# Patient Record
Sex: MALE | Race: WHITE | NOT HISPANIC OR LATINO | Employment: FULL TIME | URBAN - METROPOLITAN AREA
[De-identification: names, ages, dates, MRNs, and addresses within clinical notes are randomized per-mention and may not be internally consistent; named-entity substitution may affect disease eponyms.]

---

## 2018-12-03 ENCOUNTER — HOSPITAL ENCOUNTER (OUTPATIENT)
Dept: LAB | Facility: HOSPITAL | Age: 38
Discharge: HOME | End: 2018-12-03
Attending: INTERNAL MEDICINE | Admitting: INTERNAL MEDICINE

## 2018-12-03 LAB
ALBUMIN SERPL-MCNC: 3.6 G/DL (ref 3.5–4.8)
ALBUMIN/GLOB SERPL: 1.2 {RATIO} (ref 1–1.7)
ALP SERPL-CCNC: 75 IU/L (ref 32–91)
ALT SERPL-CCNC: 43 IU/L (ref 17–63)
ANION GAP SERPL CALC-SCNC: 11 MMOL/L (ref 10–20)
AST SERPL-CCNC: 30 IU/L (ref 15–41)
BILIRUB SERPL-MCNC: 0.5 MG/DL (ref 0.3–1.2)
BNP SERPL-MCNC: 170 PG/ML
BUN SERPL-MCNC: 11 MG/DL (ref 8–20)
BUN/CREAT SERPL: 11 (ref 6.2–20.3)
CALCIUM SERPL-MCNC: 8.8 MG/DL (ref 8.9–10.3)
CHLORIDE SERPL-SCNC: 104 MMOL/L (ref 101–111)
CHOLEST SERPL-MCNC: 190 MG/DL
CHOLEST/HDLC SERPL: 4.7 {RATIO}
CONV CO2: 28 MMOL/L (ref 22–32)
CONV LDL CHOLESTEROL DIRECT: 146 MG/DL (ref 0–100)
CONV TOTAL PROTEIN: 6.7 G/DL (ref 6.1–7.9)
CREAT UR-MCNC: 1 MG/DL (ref 0.7–1.2)
GLOBULIN UR ELPH-MCNC: 3.1 G/DL (ref 2.5–3.8)
GLUCOSE SERPL-MCNC: 90 MG/DL (ref 65–99)
HDLC SERPL-MCNC: 40 MG/DL
LDLC/HDLC SERPL: 3.6 {RATIO}
LIPID INTERPRETATION: ABNORMAL
POTASSIUM SERPL-SCNC: 4 MMOL/L (ref 3.6–5.1)
SODIUM SERPL-SCNC: 139 MMOL/L (ref 136–144)
TRIGL SERPL-MCNC: 177 MG/DL
VLDLC SERPL CALC-MCNC: 4.3 MG/DL

## 2019-07-12 RX ORDER — CARVEDILOL 12.5 MG/1
TABLET ORAL
Qty: 90 TABLET | Refills: 0 | Status: SHIPPED | OUTPATIENT
Start: 2019-07-12 | End: 2019-10-17 | Stop reason: SDUPTHER

## 2019-08-16 RX ORDER — SPIRONOLACTONE 25 MG/1
TABLET ORAL
Qty: 30 TABLET | Refills: 0 | Status: SHIPPED | OUTPATIENT
Start: 2019-08-16 | End: 2019-10-17 | Stop reason: SDUPTHER

## 2019-08-16 RX ORDER — DOFETILIDE 0.5 MG/1
CAPSULE ORAL
Qty: 60 CAPSULE | Refills: 0 | Status: SHIPPED | OUTPATIENT
Start: 2019-08-16 | End: 2019-11-27 | Stop reason: SDUPTHER

## 2019-08-19 RX ORDER — HYDROCHLOROTHIAZIDE 25 MG/1
TABLET ORAL EVERY 24 HOURS
COMMUNITY
Start: 2018-08-20 | End: 2021-05-28

## 2019-08-19 RX ORDER — HYDROCODONE BITARTRATE AND ACETAMINOPHEN 5; 325 MG/1; MG/1
1 TABLET ORAL EVERY 12 HOURS
COMMUNITY
Start: 2019-03-29 | End: 2021-05-28

## 2019-08-19 RX ORDER — LISINOPRIL 20 MG/1
TABLET ORAL EVERY 12 HOURS
COMMUNITY
Start: 2018-08-20 | End: 2020-05-18

## 2019-08-22 ENCOUNTER — LAB (OUTPATIENT)
Dept: LAB | Facility: HOSPITAL | Age: 39
End: 2019-08-22

## 2019-08-22 ENCOUNTER — OFFICE VISIT (OUTPATIENT)
Dept: CARDIOLOGY | Facility: CLINIC | Age: 39
End: 2019-08-22

## 2019-08-22 VITALS
HEIGHT: 72 IN | DIASTOLIC BLOOD PRESSURE: 127 MMHG | SYSTOLIC BLOOD PRESSURE: 157 MMHG | HEART RATE: 88 BPM | BODY MASS INDEX: 42.66 KG/M2 | WEIGHT: 315 LBS | OXYGEN SATURATION: 97 %

## 2019-08-22 DIAGNOSIS — I11.0 HYPERTENSIVE HEART DISEASE WITH CHRONIC DIASTOLIC CONGESTIVE HEART FAILURE (HCC): ICD-10-CM

## 2019-08-22 DIAGNOSIS — R06.09 DYSPNEA ON EXERTION: ICD-10-CM

## 2019-08-22 DIAGNOSIS — R00.2 PALPITATIONS: ICD-10-CM

## 2019-08-22 DIAGNOSIS — I50.32 HYPERTENSIVE HEART DISEASE WITH CHRONIC DIASTOLIC CONGESTIVE HEART FAILURE (HCC): ICD-10-CM

## 2019-08-22 DIAGNOSIS — I50.22 CHRONIC SYSTOLIC CONGESTIVE HEART FAILURE (HCC): ICD-10-CM

## 2019-08-22 DIAGNOSIS — I42.0 DILATED CARDIOMYOPATHY (HCC): ICD-10-CM

## 2019-08-22 DIAGNOSIS — I10 ESSENTIAL HYPERTENSION: Primary | ICD-10-CM

## 2019-08-22 DIAGNOSIS — I10 ESSENTIAL HYPERTENSION: ICD-10-CM

## 2019-08-22 LAB
ALBUMIN SERPL-MCNC: 3.6 G/DL (ref 3.5–4.8)
ALBUMIN/GLOB SERPL: 1.1 G/DL (ref 1–1.7)
ALP SERPL-CCNC: 64 U/L (ref 32–91)
ALT SERPL W P-5'-P-CCNC: 56 U/L (ref 17–63)
ANION GAP SERPL CALCULATED.3IONS-SCNC: 15.3 MMOL/L (ref 5–15)
AST SERPL-CCNC: 37 U/L (ref 15–41)
BILIRUB SERPL-MCNC: 0.6 MG/DL (ref 0.3–1.2)
BNP SERPL-MCNC: 119 PG/ML
BUN BLD-MCNC: 9 MG/DL (ref 8–20)
BUN/CREAT SERPL: 9 (ref 6.2–20.3)
CALCIUM SPEC-SCNC: 8.9 MG/DL (ref 8.9–10.3)
CHLORIDE SERPL-SCNC: 105 MMOL/L (ref 101–111)
CO2 SERPL-SCNC: 25 MMOL/L (ref 22–32)
CREAT BLD-MCNC: 1 MG/DL (ref 0.7–1.2)
GFR SERPL CREATININE-BSD FRML MDRD: 84 ML/MIN/1.73
GLOBULIN UR ELPH-MCNC: 3.3 GM/DL (ref 2.5–3.8)
GLUCOSE BLD-MCNC: 97 MG/DL (ref 65–99)
POTASSIUM BLD-SCNC: 4.3 MMOL/L (ref 3.6–5.1)
PROT SERPL-MCNC: 6.9 G/DL (ref 6.1–7.9)
SODIUM BLD-SCNC: 141 MMOL/L (ref 136–144)
TSH SERPL DL<=0.05 MIU/L-ACNC: 2.13 MIU/ML (ref 0.34–5.6)

## 2019-08-22 PROCEDURE — 84443 ASSAY THYROID STIM HORMONE: CPT

## 2019-08-22 PROCEDURE — 36415 COLL VENOUS BLD VENIPUNCTURE: CPT

## 2019-08-22 PROCEDURE — 99214 OFFICE O/P EST MOD 30 MIN: CPT | Performed by: INTERNAL MEDICINE

## 2019-08-22 PROCEDURE — 80053 COMPREHEN METABOLIC PANEL: CPT

## 2019-08-22 PROCEDURE — 83880 ASSAY OF NATRIURETIC PEPTIDE: CPT

## 2019-10-17 RX ORDER — SPIRONOLACTONE 25 MG/1
TABLET ORAL
Qty: 30 TABLET | Refills: 0 | Status: SHIPPED | OUTPATIENT
Start: 2019-10-17 | End: 2019-11-27 | Stop reason: SDUPTHER

## 2019-10-17 RX ORDER — CARVEDILOL 12.5 MG/1
TABLET ORAL
Qty: 90 TABLET | Refills: 0 | Status: SHIPPED | OUTPATIENT
Start: 2019-10-17 | End: 2020-01-14

## 2019-12-02 RX ORDER — DOFETILIDE 0.5 MG/1
CAPSULE ORAL
Qty: 60 CAPSULE | Refills: 0 | Status: SHIPPED | OUTPATIENT
Start: 2019-12-02 | End: 2020-01-23

## 2019-12-02 RX ORDER — SPIRONOLACTONE 25 MG/1
TABLET ORAL
Qty: 30 TABLET | Refills: 0 | Status: SHIPPED | OUTPATIENT
Start: 2019-12-02 | End: 2020-01-23

## 2020-01-14 RX ORDER — CARVEDILOL 12.5 MG/1
TABLET ORAL
Qty: 90 TABLET | Refills: 0 | Status: SHIPPED | OUTPATIENT
Start: 2020-01-14 | End: 2020-03-18

## 2020-01-23 RX ORDER — DOFETILIDE 0.5 MG/1
CAPSULE ORAL
Qty: 60 CAPSULE | Refills: 0 | Status: SHIPPED | OUTPATIENT
Start: 2020-01-23 | End: 2020-03-19

## 2020-01-23 RX ORDER — SPIRONOLACTONE 25 MG/1
TABLET ORAL
Qty: 30 TABLET | Refills: 0 | Status: SHIPPED | OUTPATIENT
Start: 2020-01-23 | End: 2020-03-19

## 2020-02-03 ENCOUNTER — OFFICE VISIT (OUTPATIENT)
Dept: CARDIOLOGY | Facility: CLINIC | Age: 40
End: 2020-02-03

## 2020-02-03 VITALS
WEIGHT: 315 LBS | OXYGEN SATURATION: 95 % | BODY MASS INDEX: 42.66 KG/M2 | DIASTOLIC BLOOD PRESSURE: 100 MMHG | HEART RATE: 81 BPM | HEIGHT: 72 IN | SYSTOLIC BLOOD PRESSURE: 143 MMHG

## 2020-02-03 DIAGNOSIS — I42.0 DILATED CARDIOMYOPATHY (HCC): ICD-10-CM

## 2020-02-03 DIAGNOSIS — R94.31 ABNORMAL EKG: ICD-10-CM

## 2020-02-03 DIAGNOSIS — I10 ESSENTIAL HYPERTENSION: ICD-10-CM

## 2020-02-03 DIAGNOSIS — I48.0 PAROXYSMAL ATRIAL FIBRILLATION (HCC): ICD-10-CM

## 2020-02-03 DIAGNOSIS — Z79.01 LONG TERM (CURRENT) USE OF ANTICOAGULANTS: ICD-10-CM

## 2020-02-03 DIAGNOSIS — R06.09 DYSPNEA ON EXERTION: Primary | ICD-10-CM

## 2020-02-03 DIAGNOSIS — I50.22 CHRONIC HFREF (HEART FAILURE WITH REDUCED EJECTION FRACTION) (HCC): ICD-10-CM

## 2020-02-03 PROCEDURE — 93000 ELECTROCARDIOGRAM COMPLETE: CPT | Performed by: INTERNAL MEDICINE

## 2020-02-03 PROCEDURE — 99214 OFFICE O/P EST MOD 30 MIN: CPT | Performed by: INTERNAL MEDICINE

## 2020-02-03 NOTE — PROGRESS NOTES
Subjective:     Encounter Date:02/03/2020      Patient ID: Marquise Short is a 39 y.o. male.    Chief Complaint : Follow-up for A. 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  Patient is complaining of dyspnea on exertion, relieved with rest.  EKG showing new abnormality compared to old EKG.  Patient is tolerating anticoagulation with Eliquis without bleeding issues.    Patient had DC cardioversion on 01/07/2019 is complaining of intermittent palpitations with no aggravating or relieving factors.  Has shortness of breath sometimes at rest sometimes with exertion relieved with rest. Denies any chest pain, patient had a nuclear which showed EF of 20% and soft tissue attenuation in the inferior defect and an echo which showed EF of 40%. patient drinks beer regularly.  Patient's arterial blood pressure is 143/100, heart rate 81, O2 sat of 95% on room air  Patient had labs from 3/21/2019 revealed normal Chem-7 magnesium of 2.2.  BNP from 12/3/2018 was elevated at 170, repeat BNP from 8/22/2019 was normal at 119,, CMP was normal 8/22/1990.     ASSESSMENT:  #Uncontrolled hypertension  #Dyspnea on exertion   # palpitations  # atrial fibrillation on long-term anticoagulation  # chronic systolic and diastolic congestive heart failure due to atrial fibrillation and tachycardia induced cardiomyopathy, HCVD  #  chest pain,  equivocal stress test with fixed inferior defect 06/29/2018  # snores, obesity  #  cardiomyopathy probably tachycardia induced  #  Hypertension/ HCVD    PLAN:  Patient's BNP level is  normal , may be the dyspnea is anginal equivalent we will schedule a stress test, patient has abnormal EKG, therefore will schedule myocardial perfusion imaging.  Risk benefits alternatives explained.  We will check lipid profile and CMP before next visit  Will continue Eliquis, carvedilol, lisinopril hydrochlorothiazide.  Patient would benefit from sleep apnea evaluation, will send to Pulmonary for sleep evaluation   continue anticoagulation with Eliquis, risk benefits alternatives explained   counseled on smoking  cessation and ETOH cessation absolutely & completely   counseled on weight loss diet and exercise and compliance  Reviewed EKG results with patient  This patient to check blood pressure at home         Assessment:          Diagnosis Plan   1. Dyspnea on exertion  Stress Test With Myocardial Perfusion One Day    Comprehensive Metabolic Panel    Lipid Panel   2. Paroxysmal atrial fibrillation (CMS/HCC)  Stress Test With Myocardial Perfusion One Day    Comprehensive Metabolic Panel    Lipid Panel   3. Chronic HFrEF (heart failure with reduced ejection fraction) (CMS/HCC)  Stress Test With Myocardial Perfusion One Day    Comprehensive Metabolic Panel    Lipid Panel   4. Dilated cardiomyopathy (CMS/HCC)  Stress Test With Myocardial Perfusion One Day    Comprehensive Metabolic Panel    Lipid Panel   5. Essential hypertension  Stress Test With Myocardial Perfusion One Day    Comprehensive Metabolic Panel    Lipid Panel   6. Long term (current) use of anticoagulants  Stress Test With Myocardial Perfusion One Day    Comprehensive Metabolic Panel    Lipid Panel   7. Abnormal EKG            Plan:         Past Medical History:  Past Medical History:   Diagnosis Date   • A-fib (CMS/HCC)    • Cardiomyopathy (CMS/HCC)    • Hypertension      Past Surgical History:  Past Surgical History:   Procedure Laterality Date   • CARDIOVERSION        Allergies:  No Known Allergies  Home Meds:  Current Meds:     Current Outpatient  Medications:   •  apixaban (ELIQUIS) 5 MG tablet tablet, ELIQUIS 5 MG TABS, Disp: , Rfl:   •  carvedilol (COREG) 12.5 MG tablet, TAKE 1 TABLET BY MOUTH EVERY DAY, Disp: 90 tablet, Rfl: 0  •  dofetilide (TIKOSYN) 500 MCG capsule, TAKE 1 CAPSULE(500 MCG) BY MOUTH TWICE DAILY, Disp: 60 capsule, Rfl: 0  •  lisinopril (PRINIVIL,ZESTRIL) 20 MG tablet, Every 12 (Twelve) Hours., Disp: , Rfl:   •  spironolactone (ALDACTONE) 25 MG tablet, TAKE 2 TABLETS BY MOUTH ONCE DAILY, Disp: 30 tablet, Rfl: 0  •  hydrochlorothiazide (HYDRODIURIL) 25 MG tablet, Daily., Disp: , Rfl:   •  HYDROcodone-acetaminophen (NORCO) 5-325 MG per tablet, 1 capsule Every 12 (Twelve) Hours., Disp: , Rfl:   Social History:   Social History     Tobacco Use   • Smoking status: Current Every Day Smoker     Years: 20.00   • Smokeless tobacco: Current User     Types: Chew   Substance Use Topics   • Alcohol use: Not on file      Family History:  Family History   Problem Relation Age of Onset   • Heart attack Paternal Grandfather         The following portions of the patient's history were reviewed and updated as appropriate: allergies, current medications, past family history, past medical history, past social history, past surgical history and problem list.    Review of Systems   Cardiovascular: Positive for dyspnea on exertion. Negative for chest pain, leg swelling and palpitations.   Respiratory: Positive for shortness of breath.    Neurological: Positive for numbness. Negative for dizziness.     Comprehensive review of systems were reviewed and all others review of systems were found to be negative other than HPI      ECG 12 Lead  Date/Time: 2/3/2020 6:20 PM  Performed by: Davon Kohler MD  Authorized by: Davon Kohler MD   Comparison: compared with previous ECG from 3/19/2019  Comparison to previous ECG: EKG done today reviewed by me shows sinus rhythm with inferior Q waves and T wave inversion and ST segment depression in  "inferolateral leads which is new compared to EKG from 3/19/2019                 Objective:     Physical Exam  /100 (BP Location: Left arm, Patient Position: Sitting, Cuff Size: Large Adult)   Pulse 81   Ht 182.9 cm (72\")   Wt (!) 158 kg (349 lb)   SpO2 95%   BMI 47.33 kg/m²   General:  Appears in no acute distress  Eyes: Sclera is anicteric,  conjunctiva is clear   HEENT:  No JVD. Thyroid not visibly enlarged. No mucosal pallor or cyanosis  Respiratory: Respirations regular and unlabored at rest.  Bilaterally good breath sounds, with good air entry in all fields. No crackles, rubs or wheezes auscultated  Cardiovascular: S1,S2 Regular rate and rhythm. No murmur, rub or gallop auscultated. No pretibial pitting edema  Gastrointestinal: Abdomen soft, flat, non tender. Bowel sounds present.   Musculoskeletal:  No abnormal movements  Extremities: No digital clubbing or cyanosis  Skin: Color pink. Skin warm and dry to touch. No rashes  No xanthoma  Neuro: Alert and awake, no lateralizing deficits appreciated    Lab Reviewed:                  "

## 2020-03-18 RX ORDER — CARVEDILOL 12.5 MG/1
TABLET ORAL
Qty: 90 TABLET | Refills: 0 | Status: SHIPPED | OUTPATIENT
Start: 2020-03-18 | End: 2020-04-14

## 2020-03-19 RX ORDER — SPIRONOLACTONE 25 MG/1
TABLET ORAL
Qty: 30 TABLET | Refills: 0 | Status: SHIPPED | OUTPATIENT
Start: 2020-03-19 | End: 2020-05-18

## 2020-03-19 RX ORDER — DOFETILIDE 0.5 MG/1
CAPSULE ORAL
Qty: 60 CAPSULE | Refills: 0 | Status: SHIPPED | OUTPATIENT
Start: 2020-03-19 | End: 2020-05-18

## 2020-04-14 RX ORDER — CARVEDILOL 12.5 MG/1
TABLET ORAL
Qty: 90 TABLET | Refills: 0 | Status: SHIPPED | OUTPATIENT
Start: 2020-04-14 | End: 2020-09-17 | Stop reason: SDUPTHER

## 2020-04-15 RX ORDER — APIXABAN 5 MG/1
TABLET, FILM COATED ORAL
Qty: 90 TABLET | OUTPATIENT
Start: 2020-04-15

## 2020-04-15 NOTE — TELEPHONE ENCOUNTER
Spoke with pt, he was advised he needs to be seen in office. Pt  does not want an appt, Pt stated he will just ask Dr Kohler for the refills.

## 2020-05-15 RX ORDER — SPIRONOLACTONE 25 MG/1
TABLET ORAL
Qty: 30 TABLET | Refills: 0 | OUTPATIENT
Start: 2020-05-15

## 2020-05-15 RX ORDER — LISINOPRIL 20 MG/1
TABLET ORAL
Qty: 180 TABLET | OUTPATIENT
Start: 2020-05-15

## 2020-05-15 RX ORDER — DOFETILIDE 0.5 MG/1
CAPSULE ORAL
Qty: 60 CAPSULE | Refills: 0 | OUTPATIENT
Start: 2020-05-15

## 2020-05-18 RX ORDER — SPIRONOLACTONE 25 MG/1
TABLET ORAL
Qty: 180 TABLET | Refills: 3 | Status: SHIPPED | OUTPATIENT
Start: 2020-05-18 | End: 2021-05-28 | Stop reason: SDUPTHER

## 2020-05-18 RX ORDER — DOFETILIDE 0.5 MG/1
CAPSULE ORAL
Qty: 180 CAPSULE | Refills: 3 | Status: SHIPPED | OUTPATIENT
Start: 2020-05-18 | End: 2023-01-30 | Stop reason: SDUPTHER

## 2020-05-18 RX ORDER — LISINOPRIL 20 MG/1
TABLET ORAL
Qty: 180 TABLET | Refills: 3 | Status: SHIPPED | OUTPATIENT
Start: 2020-05-18 | End: 2021-05-28 | Stop reason: SDUPTHER

## 2020-07-21 ENCOUNTER — TELEPHONE (OUTPATIENT)
Dept: CARDIOLOGY | Facility: CLINIC | Age: 40
End: 2020-07-21

## 2020-09-17 RX ORDER — CARVEDILOL 12.5 MG/1
TABLET ORAL
Qty: 90 TABLET | Refills: 0 | OUTPATIENT
Start: 2020-09-17

## 2020-09-17 RX ORDER — CARVEDILOL 12.5 MG/1
12.5 TABLET ORAL DAILY
Qty: 90 TABLET | Refills: 1 | Status: SHIPPED | OUTPATIENT
Start: 2020-09-17 | End: 2020-10-20 | Stop reason: SDUPTHER

## 2020-10-20 RX ORDER — CARVEDILOL 12.5 MG/1
12.5 TABLET ORAL DAILY
Qty: 90 TABLET | Refills: 1 | Status: SHIPPED | OUTPATIENT
Start: 2020-10-20 | End: 2021-03-25

## 2021-03-25 RX ORDER — CARVEDILOL 12.5 MG/1
12.5 TABLET ORAL DAILY
Qty: 90 TABLET | Refills: 0 | Status: SHIPPED | OUTPATIENT
Start: 2021-03-25 | End: 2021-05-28 | Stop reason: SDUPTHER

## 2021-05-03 RX ORDER — APIXABAN 5 MG/1
TABLET, FILM COATED ORAL
Qty: 30 TABLET | Refills: 0 | Status: SHIPPED | OUTPATIENT
Start: 2021-05-03 | End: 2021-05-28 | Stop reason: SDUPTHER

## 2021-05-28 ENCOUNTER — OFFICE VISIT (OUTPATIENT)
Dept: CARDIOLOGY | Facility: CLINIC | Age: 41
End: 2021-05-28

## 2021-05-28 VITALS — WEIGHT: 315 LBS | HEIGHT: 72 IN | BODY MASS INDEX: 42.66 KG/M2

## 2021-05-28 DIAGNOSIS — I10 ESSENTIAL HYPERTENSION: ICD-10-CM

## 2021-05-28 DIAGNOSIS — R06.09 DYSPNEA ON EXERTION: Primary | ICD-10-CM

## 2021-05-28 DIAGNOSIS — Z79.01 LONG TERM (CURRENT) USE OF ANTICOAGULANTS: ICD-10-CM

## 2021-05-28 DIAGNOSIS — I48.0 PAROXYSMAL ATRIAL FIBRILLATION (HCC): ICD-10-CM

## 2021-05-28 DIAGNOSIS — E66.01 MORBID OBESITY WITH BMI OF 45.0-49.9, ADULT (HCC): ICD-10-CM

## 2021-05-28 DIAGNOSIS — I50.22 CHRONIC HFREF (HEART FAILURE WITH REDUCED EJECTION FRACTION) (HCC): ICD-10-CM

## 2021-05-28 DIAGNOSIS — I42.0 DILATED CARDIOMYOPATHY (HCC): ICD-10-CM

## 2021-05-28 DIAGNOSIS — I50.32 HYPERTENSIVE HEART DISEASE WITH CHRONIC DIASTOLIC CONGESTIVE HEART FAILURE (HCC): ICD-10-CM

## 2021-05-28 DIAGNOSIS — I11.0 HYPERTENSIVE HEART DISEASE WITH CHRONIC DIASTOLIC CONGESTIVE HEART FAILURE (HCC): ICD-10-CM

## 2021-05-28 PROCEDURE — 99213 OFFICE O/P EST LOW 20 MIN: CPT | Performed by: INTERNAL MEDICINE

## 2021-05-28 RX ORDER — CARVEDILOL 12.5 MG/1
12.5 TABLET ORAL DAILY
Qty: 90 TABLET | Refills: 0 | Status: SHIPPED | OUTPATIENT
Start: 2021-05-28 | End: 2021-10-06

## 2021-05-28 RX ORDER — LISINOPRIL 20 MG/1
20 TABLET ORAL 2 TIMES DAILY
Qty: 180 TABLET | Refills: 0 | Status: SHIPPED | OUTPATIENT
Start: 2021-05-28 | End: 2021-08-31

## 2021-05-28 RX ORDER — SPIRONOLACTONE 25 MG/1
50 TABLET ORAL DAILY
Qty: 180 TABLET | Refills: 0 | Status: SHIPPED | OUTPATIENT
Start: 2021-05-28 | End: 2023-01-30 | Stop reason: SDUPTHER

## 2021-05-28 NOTE — PROGRESS NOTES
Subjective:     Encounter Date:05/28/2021      Patient ID: Marquise Short is a 40 y.o. male.    Chief Complaint :Follow-up for A. fib, cardiomyopathy, hypertensive cardiovascular disease  History of Present Illness    You have chosen to receive care through a telephone visit. Do you consent to use a telephone visit for your medical care today? 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  Patient is complaining of dyspnea on exertion which is unchanged.  Denies any chest pain.  Patient has not been compliant with follow-up once his medications refilled with telephone visit.  Patient is on high risk medications like Tikosyn which need EKG and with his comorbid problems needs regular labs.  Patient is tolerating anticoagulation with Eliquis without bleeding issues.  Patient had DC cardioversion on 01/07/2019.  Has shortness of breath sometimes at rest sometimes with exertion relieved with rest. Denies any chest pain, patient had a nuclear which showed EF of 20% and soft tissue attenuation in the inferior defect and an echo which showed EF of 40%. patient drinks beer regularly.  Patient had labs from 3/21/2019 revealed normal Chem-7 magnesium of 2.2.  BNP from 12/3/2018 was elevated at 170, repeat BNP from 8/22/2019 was normal at 119,, CMP was normal 8/22/1990.     ASSESSMENT:  #Paroxysmal A. fib on long-term anticoagulation and Tikosyn  #Dyspnea on exertion   #Hypertension  # chronic systolic and diastolic congestive heart failure due to atrial fibrillation and tachycardia induced cardiomyopathy, HCVD  #  chest pain,  equivocal stress test with fixed inferior defect  06/29/2018  # snores, obesity  #  cardiomyopathy probably tachycardia induced  #  Hypertension/ HCVD    PLAN:  Had a lengthy discussion with patient patient cannot do televisits he needs in person visit with EKG and lab since he is on high risk medication.  If he cannot make the appointment so will not refill his medications.  Will continue Eliquis, carvedilol, lisinopril hydrochlorothiazide for a month and check lipid profile CMP BNP level and EKG and follow-up..  Patient would benefit from sleep apnea evaluation, will send to Pulmonary for sleep evaluation  Patient has [id number] or of 2 due to hypertension and CHF, would benefit from long-term anticoagulation continue anticoagulation with Eliquis, risk benefits alternatives explained   counseled on smoking  cessation and ETOH cessation absolutely & completely   counseled on weight loss diet and exercise and compliance  Advised to take an appointment within a month with labs.  Today's visit was done with telephone visit since patient cannot come to office due to work issues.  Spent 12 minutes with patient on video.          Assessment:         MDM     Diagnosis Plan   1. Dyspnea on exertion  Comprehensive Metabolic Panel    Lipid Panel    TSH    BNP   2. Paroxysmal atrial fibrillation (CMS/HCC)  Comprehensive Metabolic Panel    Lipid Panel    TSH    BNP   3. Chronic HFrEF (heart failure with reduced ejection fraction) (CMS/HCC)  Comprehensive Metabolic Panel    Lipid Panel    TSH    BNP   4. Dilated cardiomyopathy (CMS/HCC)  Comprehensive Metabolic Panel    Lipid Panel    TSH    BNP   5. Essential hypertension  Comprehensive Metabolic Panel    Lipid Panel    TSH    BNP   6. Hypertensive heart disease with chronic diastolic congestive heart failure (CMS/HCC)  Comprehensive Metabolic Panel    Lipid Panel    TSH    BNP   7. Long term (current) use of anticoagulants  Comprehensive Metabolic Panel    Lipid Panel    TSH    BNP   8. Morbid obesity with BMI of  "45.0-49.9, adult (CMS/[facility])  Comprehensive Metabolic Panel    Lipid Panel    TSH    BNP          Plan:         Past Medical History:  Past Medical History:   Diagnosis Date   • A-fib (CMS/[facility])    • Cardiomyopathy (CMS/[facility])    • Hypertension      Past Surgical History:  Past Surgical History:   Procedure Laterality Date   • CARDIOVERSION        Allergies:  No Known Allergies  Home Meds:  Current Meds:     Current Outpatient Medications:   •  apixaban (Eliquis) 5 MG tablet tablet, Take 1 tablet by mouth Daily., Disp: 180 tablet, Rfl: 0  •  carvedilol (COREG) 12.5 MG tablet, Take 1 tablet by mouth Daily., Disp: 90 tablet, Rfl: 0  •  dofetilide (TIKOSYN) 500 MCG capsule, TAKE 1 CAPSULE(500 MCG) BY MOUTH TWICE DAILY, Disp: 180 capsule, Rfl: 3  •  lisinopril (PRINIVIL,ZESTRIL) 20 MG tablet, Take 1 tablet by mouth 2 (Two) Times a Day., Disp: 180 tablet, Rfl: 0  •  spironolactone (ALDACTONE) 25 MG tablet, Take 2 tablets by mouth Daily., Disp: 180 tablet, Rfl: 0  Social History:   Social History     Tobacco Use   • Smoking status: Current Every Day Smoker     Years: 20.00   • Smokeless tobacco: Current User     Types: Chew   Substance Use Topics   • Alcohol use: Not on file      Family History:  Family History   Problem Relation Age of Onset   • Heart attack Paternal Grandfather         The following portions of the patient's history were reviewed and updated as appropriate: allergies, current medications, past family history, past medical history, past social history, past surgical history and problem list.      Review of Systems   Constitutional: Positive for malaise/fatigue.   Cardiovascular: Negative for chest pain, leg swelling and palpitations.   Respiratory: Positive for shortness of breath.    Skin: Negative for rash.   Neurological: Negative for dizziness, light-headedness and numbness.     All other systems are negative    Procedures       Objective:     Physical Exam  Ht 182.9 cm (72\")   Wt (!) 154 kg (340 lb)   " BMI 46.11 kg/m²   General:  Appears in no acute distress, obese  Eyes: Sclera is anicteric,  conjunctiva is clear   HEENT:  Thyroid not visibly enlarged. No mucosal pallor or cyanosis  Respiratory: Respirations regular and unlabored at rest.  Skin: Color pink.   Neuro: Alert and awake.    Lab Reviewed:

## 2021-06-24 ENCOUNTER — TELEPHONE (OUTPATIENT)
Dept: CARDIOLOGY | Facility: CLINIC | Age: 41
End: 2021-06-24

## 2021-06-24 DIAGNOSIS — I50.22 CHRONIC HFREF (HEART FAILURE WITH REDUCED EJECTION FRACTION) (HCC): ICD-10-CM

## 2021-06-24 DIAGNOSIS — I50.32 HYPERTENSIVE HEART DISEASE WITH CHRONIC DIASTOLIC CONGESTIVE HEART FAILURE (HCC): ICD-10-CM

## 2021-06-24 DIAGNOSIS — Z79.01 LONG TERM (CURRENT) USE OF ANTICOAGULANTS: ICD-10-CM

## 2021-06-24 DIAGNOSIS — I48.0 PAROXYSMAL ATRIAL FIBRILLATION (HCC): ICD-10-CM

## 2021-06-24 DIAGNOSIS — I42.0 DILATED CARDIOMYOPATHY (HCC): ICD-10-CM

## 2021-06-24 DIAGNOSIS — E66.01 MORBID OBESITY WITH BMI OF 45.0-49.9, ADULT (HCC): ICD-10-CM

## 2021-06-24 DIAGNOSIS — I10 ESSENTIAL HYPERTENSION: ICD-10-CM

## 2021-06-24 DIAGNOSIS — R06.09 DYSPNEA ON EXERTION: ICD-10-CM

## 2021-06-24 DIAGNOSIS — I11.0 HYPERTENSIVE HEART DISEASE WITH CHRONIC DIASTOLIC CONGESTIVE HEART FAILURE (HCC): ICD-10-CM

## 2021-06-24 NOTE — TELEPHONE ENCOUNTER
He could not understand dr foley on 5/28 video call, please advise him if he needs labs and talk about prescriptions

## 2021-07-31 ENCOUNTER — APPOINTMENT (OUTPATIENT)
Dept: CT IMAGING | Facility: HOSPITAL | Age: 41
End: 2021-07-31

## 2021-07-31 ENCOUNTER — HOSPITAL ENCOUNTER (EMERGENCY)
Facility: HOSPITAL | Age: 41
Discharge: HOME | End: 2021-07-31
Attending: EMERGENCY MEDICINE | Admitting: EMERGENCY MEDICINE

## 2021-07-31 VITALS
DIASTOLIC BLOOD PRESSURE: 93 MMHG | SYSTOLIC BLOOD PRESSURE: 147 MMHG | TEMPERATURE: 98.1 F | HEART RATE: 86 BPM | RESPIRATION RATE: 18 BRPM | WEIGHT: 315 LBS | BODY MASS INDEX: 42.66 KG/M2 | OXYGEN SATURATION: 98 % | HEIGHT: 72 IN

## 2021-07-31 DIAGNOSIS — M54.50 ACUTE BILATERAL LOW BACK PAIN WITHOUT SCIATICA: Primary | ICD-10-CM

## 2021-07-31 DIAGNOSIS — H10.31 ACUTE CONJUNCTIVITIS OF RIGHT EYE, UNSPECIFIED ACUTE CONJUNCTIVITIS TYPE: ICD-10-CM

## 2021-07-31 LAB
BILIRUB UR QL STRIP: NEGATIVE
CLARITY UR: CLEAR
COLOR UR: YELLOW
GLUCOSE UR STRIP-MCNC: NEGATIVE MG/DL
HGB UR QL STRIP.AUTO: NEGATIVE
KETONES UR QL STRIP: NEGATIVE
LEUKOCYTE ESTERASE UR QL STRIP.AUTO: NEGATIVE
NITRITE UR QL STRIP: NEGATIVE
PH UR STRIP.AUTO: 6 [PH] (ref 5–8)
PROT UR QL STRIP: NEGATIVE
SP GR UR STRIP: 1.02 (ref 1–1.03)
UROBILINOGEN UR QL STRIP: NORMAL

## 2021-07-31 PROCEDURE — 74176 CT ABD & PELVIS W/O CONTRAST: CPT

## 2021-07-31 PROCEDURE — 99283 EMERGENCY DEPT VISIT LOW MDM: CPT

## 2021-07-31 PROCEDURE — 81003 URINALYSIS AUTO W/O SCOPE: CPT | Performed by: EMERGENCY MEDICINE

## 2021-07-31 RX ORDER — POLYMYXIN B SULFATE AND TRIMETHOPRIM 1; 10000 MG/ML; [USP'U]/ML
1 SOLUTION OPHTHALMIC EVERY 4 HOURS
Qty: 10 ML | Refills: 0 | Status: SHIPPED | OUTPATIENT
Start: 2021-07-31 | End: 2021-08-07

## 2021-07-31 RX ORDER — CYCLOBENZAPRINE HCL 10 MG
10 TABLET ORAL 3 TIMES DAILY PRN
Qty: 20 TABLET | Refills: 0 | Status: SHIPPED | OUTPATIENT
Start: 2021-07-31 | End: 2023-02-18

## 2021-07-31 RX ORDER — HYDROCODONE BITARTRATE AND ACETAMINOPHEN 7.5; 325 MG/1; MG/1
1 TABLET ORAL EVERY 6 HOURS PRN
Qty: 12 TABLET | Refills: 0 | Status: SHIPPED | OUTPATIENT
Start: 2021-07-31 | End: 2023-02-18

## 2021-07-31 RX ORDER — METHYLPREDNISOLONE 4 MG/1
TABLET ORAL
Qty: 21 TABLET | Refills: 0 | Status: SHIPPED | OUTPATIENT
Start: 2021-07-31 | End: 2023-02-18

## 2021-08-31 RX ORDER — LISINOPRIL 20 MG/1
TABLET ORAL
Qty: 180 TABLET | Refills: 2 | Status: SHIPPED | OUTPATIENT
Start: 2021-08-31 | End: 2022-10-24

## 2021-08-31 NOTE — TELEPHONE ENCOUNTER
Rx Refill Note  Requested Prescriptions     Pending Prescriptions Disp Refills   • lisinopril (PRINIVIL,ZESTRIL) 20 MG tablet [Pharmacy Med Name: LISINOPRIL 20MG TABLETS] 180 tablet 0     Sig: TAKE 1 TABLET BY MOUTH TWICE DAILY      Last office visit with prescribing clinician: 5/28/2021      Next office visit with prescribing clinician: Visit date not found            Jaleesa Iqbal MA  08/31/21, 11:20 EDT

## 2021-10-06 RX ORDER — CARVEDILOL 12.5 MG/1
12.5 TABLET ORAL DAILY
Qty: 90 TABLET | Refills: 2 | Status: SHIPPED | OUTPATIENT
Start: 2021-10-06 | End: 2022-09-12

## 2021-10-06 NOTE — TELEPHONE ENCOUNTER
Rx Refill Note  Requested Prescriptions     Pending Prescriptions Disp Refills   • carvedilol (COREG) 12.5 MG tablet [Pharmacy Med Name: CARVEDILOL 12.5MG TABLETS] 90 tablet 0     Sig: TAKE 1 TABLET BY MOUTH DAILY      Last office visit with prescribing clinician: 5/28/2021      Next office visit with prescribing clinician: Visit date not found            Jaleesa Iqbal MA  10/06/21, 10:59 EDT

## 2021-11-01 RX ORDER — APIXABAN 5 MG/1
TABLET, FILM COATED ORAL
Qty: 90 TABLET | Refills: 1 | Status: SHIPPED | OUTPATIENT
Start: 2021-11-01 | End: 2022-02-04

## 2021-11-01 NOTE — TELEPHONE ENCOUNTER
Rx Refill Note  Requested Prescriptions     Pending Prescriptions Disp Refills   • Eliquis 5 MG tablet tablet [Pharmacy Med Name: ELIQUIS 5MG TABLETS] 90 tablet      Sig: TAKE 1 TABLET BY MOUTH DAILY      Last office visit with prescribing clinician: 5/28/2021      Next office visit with prescribing clinician: Visit date not found            Aria Rincon MA  11/01/21, 08:44 EDT

## 2022-01-31 ENCOUNTER — TELEPHONE (OUTPATIENT)
Dept: CARDIOLOGY | Facility: CLINIC | Age: 42
End: 2022-01-31

## 2022-02-01 NOTE — TELEPHONE ENCOUNTER
Xarelto is the preferred drug on patients plan. Insurance has denied coverage for Eliqius, stating they would need documentation stating why the patient is unable to take Xarelto.      Please advise.

## 2022-02-02 NOTE — TELEPHONE ENCOUNTER
Per Dr. Kohler, switch to Xarelto 20 mg. Pt made aware and verbalized understanding.       Please d/c Eliquis for the medication list.

## 2022-09-12 ENCOUNTER — TELEPHONE (OUTPATIENT)
Dept: CARDIOLOGY | Facility: CLINIC | Age: 42
End: 2022-09-12

## 2022-09-12 RX ORDER — CARVEDILOL 12.5 MG/1
12.5 TABLET ORAL DAILY
Qty: 30 TABLET | Refills: 0 | Status: SHIPPED | OUTPATIENT
Start: 2022-09-12 | End: 2022-10-24

## 2022-09-12 RX ORDER — RIVAROXABAN 20 MG/1
TABLET, FILM COATED ORAL
Qty: 30 TABLET | Refills: 0 | Status: SHIPPED | OUTPATIENT
Start: 2022-09-12 | End: 2022-10-24

## 2022-09-12 NOTE — TELEPHONE ENCOUNTER
Rx Refill Note  Requested Prescriptions     Pending Prescriptions Disp Refills   • Xarelto 20 MG tablet [Pharmacy Med Name: XARELTO 20MG TABLETS] 30 tablet 0     Sig: TAKE 1 TABLET BY MOUTH DAILY   • carvedilol (COREG) 12.5 MG tablet [Pharmacy Med Name: CARVEDILOL 12.5MG TABLETS] 30 tablet 0     Sig: TAKE 1 TABLET BY MOUTH DAILY      Last office visit with prescribing clinician: 5/28/2021      Next office visit with prescribing clinician: Visit date not found            Page ASIYA Mead  09/12/22, 10:15 EDT

## 2022-09-14 NOTE — TELEPHONE ENCOUNTER
TRIED TO CALL PATIENT AGAIN. PHONE GOES STRAIGHT TO VOICEMAIL AND VOICEMAIL IS FULL. WILL MAIL LETTER TO PATIENT.

## 2022-10-24 RX ORDER — LISINOPRIL 20 MG/1
20 TABLET ORAL 2 TIMES DAILY
Qty: 30 TABLET | Refills: 0 | OUTPATIENT
Start: 2022-10-24 | End: 2023-01-30 | Stop reason: SDUPTHER

## 2022-10-24 RX ORDER — RIVAROXABAN 20 MG/1
TABLET, FILM COATED ORAL
Qty: 15 TABLET | Refills: 0 | Status: SHIPPED | OUTPATIENT
Start: 2022-10-24 | End: 2023-01-30 | Stop reason: SDUPTHER

## 2022-10-24 RX ORDER — CARVEDILOL 12.5 MG/1
12.5 TABLET ORAL DAILY
Qty: 15 TABLET | Refills: 0 | Status: SHIPPED | OUTPATIENT
Start: 2022-10-24 | End: 2022-12-01

## 2022-10-24 RX ORDER — LISINOPRIL 20 MG/1
TABLET ORAL
Qty: 180 TABLET | Refills: 0 | Status: SHIPPED | OUTPATIENT
Start: 2022-10-24 | End: 2022-10-24 | Stop reason: SDUPTHER

## 2022-10-24 NOTE — TELEPHONE ENCOUNTER
Rx Refill Note  Requested Prescriptions     Pending Prescriptions Disp Refills   • lisinopril (PRINIVIL,ZESTRIL) 20 MG tablet [Pharmacy Med Name: LISINOPRIL 20MG TABLETS] 180 tablet 2     Sig: TAKE 1 TABLET BY MOUTH TWICE DAILY      Last office visit with prescribing clinician: 5/28/2021      Next office visit with prescribing clinician: 10/23/2022            Jaleesa Iqbal MA  10/24/22, 10:17 EDT

## 2022-10-24 NOTE — TELEPHONE ENCOUNTER
Rx Refill Note  Requested Prescriptions     Pending Prescriptions Disp Refills   • Xarelto 20 MG tablet [Pharmacy Med Name: XARELTO 20MG TABLETS] 30 tablet 0     Sig: TAKE 1 TABLET BY MOUTH DAILY   • carvedilol (COREG) 12.5 MG tablet [Pharmacy Med Name: CARVEDILOL 12.5MG TABLETS] 30 tablet 0     Sig: TAKE 1 TABLET BY MOUTH DAILY      Last office visit with prescribing clinician: 5/28/2021      Next office visit with prescribing clinician: Visit date not found            Jaleesa Iqbal MA  10/24/22, 10:46 EDT

## 2022-10-24 NOTE — TELEPHONE ENCOUNTER
Pharmacy contacted regarding Lisinopril refill. Quantity changed to 30. Attempted to contact the patient, [facility] full. Letter sent requesting patient make an appointment.

## 2022-11-02 RX ORDER — LISINOPRIL 20 MG/1
TABLET ORAL
Qty: 30 TABLET | Refills: 0 | OUTPATIENT
Start: 2022-11-02

## 2022-11-02 NOTE — TELEPHONE ENCOUNTER
Rx Refill Note  Requested Prescriptions     Pending Prescriptions Disp Refills   • lisinopril (PRINIVIL,ZESTRIL) 20 MG tablet [Pharmacy Med Name: LISINOPRIL 20MG TABLETS] 30 tablet 0     Sig: TAKE 1 TABLET BY MOUTH TWICE DAILY      Last office visit with prescribing clinician: 5/28/2021      Next office visit with prescribing clinician: Visit date not found            Kelley Dockery MA  11/02/22, 08:36 EDT

## 2022-12-01 RX ORDER — CARVEDILOL 12.5 MG/1
12.5 TABLET ORAL DAILY
Qty: 15 TABLET | Refills: 0 | Status: SHIPPED | OUTPATIENT
Start: 2022-12-01 | End: 2023-01-30 | Stop reason: SDUPTHER

## 2022-12-01 NOTE — TELEPHONE ENCOUNTER
Rx Refill Note  Requested Prescriptions     Pending Prescriptions Disp Refills   • carvedilol (COREG) 12.5 MG tablet [Pharmacy Med Name: CARVEDILOL 12.5MG TABLETS] 15 tablet 0     Sig: TAKE 1 TABLET BY MOUTH DAILY      Last office visit with prescribing clinician: 5/28/2021   Last telemedicine visit with prescribing clinician: Visit date not found   Next office visit with prescribing clinician: Visit date not found                         Would you like a call back once the refill request has been completed: [] Yes [] No    If the office needs to give you a call back, can they leave a voicemail: [] Yes [] No    Jaleesa Iqbal MA  12/01/22, 09:19 EST

## 2022-12-20 ENCOUNTER — TELEPHONE (OUTPATIENT)
Dept: CARDIOLOGY | Facility: CLINIC | Age: 42
End: 2022-12-20

## 2022-12-20 RX ORDER — RIVAROXABAN 20 MG/1
TABLET, FILM COATED ORAL
Qty: 15 TABLET | Refills: 0 | OUTPATIENT
Start: 2022-12-20

## 2022-12-20 NOTE — TELEPHONE ENCOUNTER
Letter sent to patient in September, several calls have been made, notes to pharmacy were sent requesting that patient make a follow up appointment. LOV: 5/28/2021    Would you like to continue to fill medications?

## 2022-12-20 NOTE — TELEPHONE ENCOUNTER
Rx Refill Note  Requested Prescriptions     Pending Prescriptions Disp Refills   • Xarelto 20 MG tablet [Pharmacy Med Name: XARELTO 20MG TABLETS] 15 tablet 0     Sig: TAKE 1 TABLET BY MOUTH DAILY      Last office visit with prescribing clinician: 5/28/2021   Last telemedicine visit with prescribing clinician: Visit date not found   Next office visit with prescribing clinician: Visit date not found                         Would you like a call back once the refill request has been completed: [] Yes [] No    If the office needs to give you a call back, can they leave a voicemail: [] Yes [] No    Jaleesa Iqbal MA  12/20/22, 09:56 EST

## 2023-01-23 RX ORDER — RIVAROXABAN 20 MG/1
TABLET, FILM COATED ORAL
Qty: 15 TABLET | Refills: 0 | OUTPATIENT
Start: 2023-01-23

## 2023-01-23 RX ORDER — LISINOPRIL 20 MG/1
TABLET ORAL
Qty: 30 TABLET | Refills: 0 | OUTPATIENT
Start: 2023-01-23

## 2023-01-23 RX ORDER — CARVEDILOL 12.5 MG/1
12.5 TABLET ORAL DAILY
Qty: 15 TABLET | Refills: 0 | OUTPATIENT
Start: 2023-01-23

## 2023-01-23 NOTE — TELEPHONE ENCOUNTER
LOV MAY 2021      PHONE CALLS AND LETTERS SENT TO PT REGARDING MAKING A FOLLOW UP APPT     PER DR MO  NO MORE REFILLS TILL PT IS SEEN

## 2023-01-27 RX ORDER — LISINOPRIL 20 MG/1
20 TABLET ORAL 2 TIMES DAILY
Qty: 30 TABLET | Refills: 0 | OUTPATIENT
Start: 2023-01-27

## 2023-01-27 RX ORDER — CARVEDILOL 12.5 MG/1
12.5 TABLET ORAL DAILY
Qty: 15 TABLET | Refills: 0 | OUTPATIENT
Start: 2023-01-27

## 2023-01-27 NOTE — TELEPHONE ENCOUNTER
Caller: Marquise Short    Relationship: Self    Best call back number:[phone]    Requested Prescriptions:   Requested Prescriptions     Pending Prescriptions Disp Refills   • lisinopril (PRINIVIL,ZESTRIL) 20 MG tablet 30 tablet 0     Sig: Take 1 tablet by mouth 2 (Two) Times a Day.   • rivaroxaban (Xarelto) 20 MG tablet 15 tablet 0     Sig: Take 1 tablet by mouth Daily.   • carvedilol (COREG) 12.5 MG tablet 15 tablet 0     Sig: Take 1 tablet by mouth Daily.        Pharmacy where request should be sent:      Additional details provided by patient:     Does the patient have less than a 3 day supply:  [x] Yes  [] No    Would you like a call back once the refill request has been completed: [x] Yes [] No    If the office needs to give you a call back, can they leave a voicemail: [x] Yes [] No    Agnieszka Cintron Rep   01/27/23 08:57 EST

## 2023-01-27 NOTE — TELEPHONE ENCOUNTER
LOV 5/28/21    MULTIPLE PHONE CALLS AND LETTER PT NEEDS APPT    PER DR MO NO MORE REFILLS TILL PT IS SEEN

## 2023-01-30 RX ORDER — SPIRONOLACTONE 25 MG/1
50 TABLET ORAL DAILY
Qty: 60 TABLET | Refills: 1 | Status: SHIPPED | OUTPATIENT
Start: 2023-01-30 | End: 2023-04-03 | Stop reason: SDUPTHER

## 2023-01-30 RX ORDER — CARVEDILOL 12.5 MG/1
12.5 TABLET ORAL DAILY
Qty: 30 TABLET | Refills: 1 | Status: SHIPPED | OUTPATIENT
Start: 2023-01-30 | End: 2023-04-03

## 2023-01-30 RX ORDER — DOFETILIDE 0.5 MG/1
500 CAPSULE ORAL 2 TIMES DAILY
Qty: 60 CAPSULE | Refills: 1 | Status: SHIPPED | OUTPATIENT
Start: 2023-01-30 | End: 2023-02-18

## 2023-01-30 RX ORDER — LISINOPRIL 20 MG/1
20 TABLET ORAL 2 TIMES DAILY
Qty: 60 TABLET | Refills: 1 | OUTPATIENT
Start: 2023-01-30 | End: 2023-02-18

## 2023-02-18 ENCOUNTER — HOSPITAL ENCOUNTER (OUTPATIENT)
Facility: HOSPITAL | Age: 43
Setting detail: OBSERVATION
Discharge: HOME | End: 2023-02-20
Attending: EMERGENCY MEDICINE | Admitting: EMERGENCY MEDICINE
Payer: COMMERCIAL

## 2023-02-18 ENCOUNTER — APPOINTMENT (OUTPATIENT)
Dept: GENERAL RADIOLOGY | Facility: HOSPITAL | Age: 43
End: 2023-02-18
Payer: COMMERCIAL

## 2023-02-18 DIAGNOSIS — I50.9 ACUTE CONGESTIVE HEART FAILURE, UNSPECIFIED HEART FAILURE TYPE: ICD-10-CM

## 2023-02-18 DIAGNOSIS — I48.91 ATRIAL FIBRILLATION, UNSPECIFIED TYPE: Primary | ICD-10-CM

## 2023-02-18 DIAGNOSIS — E87.1 HYPONATREMIA: ICD-10-CM

## 2023-02-18 DIAGNOSIS — R77.8 ELEVATED TROPONIN I LEVEL: ICD-10-CM

## 2023-02-18 DIAGNOSIS — R06.00 DYSPNEA, UNSPECIFIED TYPE: ICD-10-CM

## 2023-02-18 LAB
ALBUMIN SERPL-MCNC: 3.7 G/DL (ref 3.5–5.2)
ALBUMIN/GLOB SERPL: 1.1 G/DL
ALP SERPL-CCNC: 78 U/L (ref 39–117)
ALT SERPL W P-5'-P-CCNC: 75 U/L (ref 1–41)
AMPHET+METHAMPHET UR QL: NEGATIVE
ANION GAP SERPL CALCULATED.3IONS-SCNC: 9 MMOL/L (ref 5–15)
APTT PPP: 36.1 SECONDS (ref 24–31)
AST SERPL-CCNC: 60 U/L (ref 1–40)
B PARAPERT DNA SPEC QL NAA+PROBE: NOT DETECTED
B PERT DNA SPEC QL NAA+PROBE: NOT DETECTED
BACTERIA UR QL AUTO: ABNORMAL /HPF
BARBITURATES UR QL SCN: NEGATIVE
BASOPHILS # BLD AUTO: 0 10*3/MM3 (ref 0–0.2)
BASOPHILS NFR BLD AUTO: 0.7 % (ref 0–1.5)
BENZODIAZ UR QL SCN: NEGATIVE
BILIRUB SERPL-MCNC: 0.7 MG/DL (ref 0–1.2)
BILIRUB UR QL STRIP: NEGATIVE
BUN SERPL-MCNC: 9 MG/DL (ref 6–20)
BUN/CREAT SERPL: 8.8 (ref 7–25)
C PNEUM DNA NPH QL NAA+NON-PROBE: NOT DETECTED
CALCIUM SPEC-SCNC: 8.6 MG/DL (ref 8.6–10.5)
CANNABINOIDS SERPL QL: NEGATIVE
CHLORIDE SERPL-SCNC: 98 MMOL/L (ref 98–107)
CLARITY UR: CLEAR
CO2 SERPL-SCNC: 22 MMOL/L (ref 22–29)
COCAINE UR QL: NEGATIVE
COLOR UR: YELLOW
CREAT SERPL-MCNC: 1.02 MG/DL (ref 0.76–1.27)
D-LACTATE SERPL-SCNC: 1.2 MMOL/L (ref 0.5–2)
DEPRECATED RDW RBC AUTO: 55.1 FL (ref 37–54)
EGFRCR SERPLBLD CKD-EPI 2021: 95.3 ML/MIN/1.73
EOSINOPHIL # BLD AUTO: 0 10*3/MM3 (ref 0–0.4)
EOSINOPHIL NFR BLD AUTO: 0.1 % (ref 0.3–6.2)
ERYTHROCYTE [DISTWIDTH] IN BLOOD BY AUTOMATED COUNT: 14.9 % (ref 12.3–15.4)
FLUAV SUBTYP SPEC NAA+PROBE: NOT DETECTED
FLUBV RNA ISLT QL NAA+PROBE: NOT DETECTED
GEN 5 2HR TROPONIN T REFLEX: 31 NG/L
GLOBULIN UR ELPH-MCNC: 3.3 GM/DL
GLUCOSE SERPL-MCNC: 100 MG/DL (ref 65–99)
GLUCOSE UR STRIP-MCNC: NEGATIVE MG/DL
HADV DNA SPEC NAA+PROBE: NOT DETECTED
HCOV 229E RNA SPEC QL NAA+PROBE: NOT DETECTED
HCOV HKU1 RNA SPEC QL NAA+PROBE: NOT DETECTED
HCOV NL63 RNA SPEC QL NAA+PROBE: NOT DETECTED
HCOV OC43 RNA SPEC QL NAA+PROBE: NOT DETECTED
HCT VFR BLD AUTO: 42.1 % (ref 37.5–51)
HGB BLD-MCNC: 14.3 G/DL (ref 13–17.7)
HGB UR QL STRIP.AUTO: ABNORMAL
HMPV RNA NPH QL NAA+NON-PROBE: NOT DETECTED
HPIV1 RNA ISLT QL NAA+PROBE: NOT DETECTED
HPIV2 RNA SPEC QL NAA+PROBE: NOT DETECTED
HPIV3 RNA NPH QL NAA+PROBE: NOT DETECTED
HPIV4 P GENE NPH QL NAA+PROBE: NOT DETECTED
HYALINE CASTS UR QL AUTO: ABNORMAL /LPF
INR PPP: 1.52 (ref 0.93–1.1)
KETONES UR QL STRIP: NEGATIVE
LEUKOCYTE ESTERASE UR QL STRIP.AUTO: NEGATIVE
LYMPHOCYTES # BLD AUTO: 0.7 10*3/MM3 (ref 0.7–3.1)
LYMPHOCYTES NFR BLD AUTO: 10.6 % (ref 19.6–45.3)
M PNEUMO IGG SER IA-ACNC: NOT DETECTED
MCH RBC QN AUTO: 34.2 PG (ref 26.6–33)
MCHC RBC AUTO-ENTMCNC: 34 G/DL (ref 31.5–35.7)
MCV RBC AUTO: 100.5 FL (ref 79–97)
METHADONE UR QL SCN: NEGATIVE
MONOCYTES # BLD AUTO: 1 10*3/MM3 (ref 0.1–0.9)
MONOCYTES NFR BLD AUTO: 15 % (ref 5–12)
NEUTROPHILS NFR BLD AUTO: 4.7 10*3/MM3 (ref 1.7–7)
NEUTROPHILS NFR BLD AUTO: 73.6 % (ref 42.7–76)
NITRITE UR QL STRIP: NEGATIVE
NRBC BLD AUTO-RTO: 0.1 /100 WBC (ref 0–0.2)
NT-PROBNP SERPL-MCNC: 5020 PG/ML (ref 0–450)
OPIATES UR QL: NEGATIVE
OSMOLALITY SERPL: 276 MOSM/KG (ref 275–295)
OXYCODONE UR QL SCN: NEGATIVE
PH UR STRIP.AUTO: 7.5 [PH] (ref 5–8)
PLATELET # BLD AUTO: 163 10*3/MM3 (ref 140–450)
PMV BLD AUTO: 7.9 FL (ref 6–12)
POTASSIUM SERPL-SCNC: 4.1 MMOL/L (ref 3.5–5.2)
PROT SERPL-MCNC: 7 G/DL (ref 6–8.5)
PROT UR QL STRIP: ABNORMAL
PROTHROMBIN TIME: 15.3 SECONDS (ref 9.6–11.7)
RBC # BLD AUTO: 4.19 10*6/MM3 (ref 4.14–5.8)
RBC # UR STRIP: ABNORMAL /HPF
REF LAB TEST METHOD: ABNORMAL
RHINOVIRUS RNA SPEC NAA+PROBE: NOT DETECTED
RSV RNA NPH QL NAA+NON-PROBE: NOT DETECTED
SARS-COV-2 RNA NPH QL NAA+NON-PROBE: DETECTED
SODIUM SERPL-SCNC: 129 MMOL/L (ref 136–145)
SODIUM UR-SCNC: 168 MMOL/L
SP GR UR STRIP: 1.01 (ref 1–1.03)
SQUAMOUS #/AREA URNS HPF: ABNORMAL /HPF
TROPONIN T DELTA: 5 NG/L
TROPONIN T SERPL HS-MCNC: 26 NG/L
TROPONIN T SERPL HS-MCNC: 30 NG/L
UROBILINOGEN UR QL STRIP: ABNORMAL
WBC # UR STRIP: ABNORMAL /HPF
WBC NRBC COR # BLD: 6.4 10*3/MM3 (ref 3.4–10.8)

## 2023-02-18 PROCEDURE — 85025 COMPLETE CBC W/AUTO DIFF WBC: CPT | Performed by: NURSE PRACTITIONER

## 2023-02-18 PROCEDURE — 84484 ASSAY OF TROPONIN QUANT: CPT | Performed by: NURSE PRACTITIONER

## 2023-02-18 PROCEDURE — 96361 HYDRATE IV INFUSION ADD-ON: CPT

## 2023-02-18 PROCEDURE — 85610 PROTHROMBIN TIME: CPT | Performed by: NURSE PRACTITIONER

## 2023-02-18 PROCEDURE — 83605 ASSAY OF LACTIC ACID: CPT | Performed by: NURSE PRACTITIONER

## 2023-02-18 PROCEDURE — 87040 BLOOD CULTURE FOR BACTERIA: CPT | Performed by: NURSE PRACTITIONER

## 2023-02-18 PROCEDURE — 99285 EMERGENCY DEPT VISIT HI MDM: CPT

## 2023-02-18 PROCEDURE — 93005 ELECTROCARDIOGRAM TRACING: CPT | Performed by: EMERGENCY MEDICINE

## 2023-02-18 PROCEDURE — 81001 URINALYSIS AUTO W/SCOPE: CPT | Performed by: NURSE PRACTITIONER

## 2023-02-18 PROCEDURE — 80307 DRUG TEST PRSMV CHEM ANLYZR: CPT | Performed by: NURSE PRACTITIONER

## 2023-02-18 PROCEDURE — 25010000002 FUROSEMIDE PER 20 MG: Performed by: NURSE PRACTITIONER

## 2023-02-18 PROCEDURE — 83930 ASSAY OF BLOOD OSMOLALITY: CPT | Performed by: NURSE PRACTITIONER

## 2023-02-18 PROCEDURE — 84300 ASSAY OF URINE SODIUM: CPT | Performed by: NURSE PRACTITIONER

## 2023-02-18 PROCEDURE — G0378 HOSPITAL OBSERVATION PER HR: HCPCS

## 2023-02-18 PROCEDURE — 85730 THROMBOPLASTIN TIME PARTIAL: CPT | Performed by: NURSE PRACTITIONER

## 2023-02-18 PROCEDURE — 93005 ELECTROCARDIOGRAM TRACING: CPT

## 2023-02-18 PROCEDURE — 71045 X-RAY EXAM CHEST 1 VIEW: CPT

## 2023-02-18 PROCEDURE — 96376 TX/PRO/DX INJ SAME DRUG ADON: CPT

## 2023-02-18 PROCEDURE — 83880 ASSAY OF NATRIURETIC PEPTIDE: CPT | Performed by: NURSE PRACTITIONER

## 2023-02-18 PROCEDURE — 84484 ASSAY OF TROPONIN QUANT: CPT | Performed by: EMERGENCY MEDICINE

## 2023-02-18 PROCEDURE — 80053 COMPREHEN METABOLIC PANEL: CPT | Performed by: NURSE PRACTITIONER

## 2023-02-18 PROCEDURE — 96375 TX/PRO/DX INJ NEW DRUG ADDON: CPT

## 2023-02-18 PROCEDURE — 0202U NFCT DS 22 TRGT SARS-COV-2: CPT | Performed by: NURSE PRACTITIONER

## 2023-02-18 PROCEDURE — 96374 THER/PROPH/DIAG INJ IV PUSH: CPT

## 2023-02-18 RX ORDER — ACETAMINOPHEN 325 MG/1
650 TABLET ORAL EVERY 4 HOURS PRN
Status: DISCONTINUED | OUTPATIENT
Start: 2023-02-18 | End: 2023-02-20 | Stop reason: HOSPADM

## 2023-02-18 RX ORDER — BISACODYL 10 MG
10 SUPPOSITORY, RECTAL RECTAL DAILY PRN
Status: DISCONTINUED | OUTPATIENT
Start: 2023-02-18 | End: 2023-02-20 | Stop reason: HOSPADM

## 2023-02-18 RX ORDER — SODIUM CHLORIDE 0.9 % (FLUSH) 0.9 %
10 SYRINGE (ML) INJECTION AS NEEDED
Status: DISCONTINUED | OUTPATIENT
Start: 2023-02-18 | End: 2023-02-20 | Stop reason: HOSPADM

## 2023-02-18 RX ORDER — CARVEDILOL 6.25 MG/1
12.5 TABLET ORAL DAILY
Status: DISCONTINUED | OUTPATIENT
Start: 2023-02-19 | End: 2023-02-20 | Stop reason: HOSPADM

## 2023-02-18 RX ORDER — BISACODYL 5 MG/1
5 TABLET, DELAYED RELEASE ORAL DAILY PRN
Status: DISCONTINUED | OUTPATIENT
Start: 2023-02-18 | End: 2023-02-20 | Stop reason: HOSPADM

## 2023-02-18 RX ORDER — SODIUM CHLORIDE 9 MG/ML
40 INJECTION, SOLUTION INTRAVENOUS AS NEEDED
Status: DISCONTINUED | OUTPATIENT
Start: 2023-02-18 | End: 2023-02-20 | Stop reason: HOSPADM

## 2023-02-18 RX ORDER — FUROSEMIDE 10 MG/ML
40 INJECTION INTRAMUSCULAR; INTRAVENOUS EVERY 12 HOURS
Status: DISCONTINUED | OUTPATIENT
Start: 2023-02-18 | End: 2023-02-18

## 2023-02-18 RX ORDER — ONDANSETRON 4 MG/1
4 TABLET, FILM COATED ORAL EVERY 6 HOURS PRN
Status: DISCONTINUED | OUTPATIENT
Start: 2023-02-18 | End: 2023-02-20 | Stop reason: HOSPADM

## 2023-02-18 RX ORDER — FUROSEMIDE 10 MG/ML
80 INJECTION INTRAMUSCULAR; INTRAVENOUS ONCE
Status: COMPLETED | OUTPATIENT
Start: 2023-02-18 | End: 2023-02-18

## 2023-02-18 RX ORDER — LISINOPRIL 20 MG/1
20 TABLET ORAL ONCE
Status: COMPLETED | OUTPATIENT
Start: 2023-02-18 | End: 2023-02-18

## 2023-02-18 RX ORDER — SPIRONOLACTONE 25 MG/1
50 TABLET ORAL DAILY
Status: DISCONTINUED | OUTPATIENT
Start: 2023-02-19 | End: 2023-02-20 | Stop reason: HOSPADM

## 2023-02-18 RX ORDER — DILTIAZEM HYDROCHLORIDE 5 MG/ML
10 INJECTION INTRAVENOUS ONCE
Status: COMPLETED | OUTPATIENT
Start: 2023-02-18 | End: 2023-02-18

## 2023-02-18 RX ORDER — SODIUM CHLORIDE 9 MG/ML
100 INJECTION, SOLUTION INTRAVENOUS CONTINUOUS
Status: DISCONTINUED | OUTPATIENT
Start: 2023-02-18 | End: 2023-02-20

## 2023-02-18 RX ORDER — SODIUM CHLORIDE 0.9 % (FLUSH) 0.9 %
10 SYRINGE (ML) INJECTION EVERY 12 HOURS SCHEDULED
Status: DISCONTINUED | OUTPATIENT
Start: 2023-02-18 | End: 2023-02-20 | Stop reason: HOSPADM

## 2023-02-18 RX ORDER — ONDANSETRON 2 MG/ML
4 INJECTION INTRAMUSCULAR; INTRAVENOUS EVERY 6 HOURS PRN
Status: DISCONTINUED | OUTPATIENT
Start: 2023-02-18 | End: 2023-02-20 | Stop reason: HOSPADM

## 2023-02-18 RX ORDER — POLYETHYLENE GLYCOL 3350 17 G/17G
17 POWDER, FOR SOLUTION ORAL DAILY PRN
Status: DISCONTINUED | OUTPATIENT
Start: 2023-02-18 | End: 2023-02-20 | Stop reason: HOSPADM

## 2023-02-18 RX ORDER — FUROSEMIDE 10 MG/ML
40 INJECTION INTRAMUSCULAR; INTRAVENOUS DAILY
Status: DISCONTINUED | OUTPATIENT
Start: 2023-02-19 | End: 2023-02-20 | Stop reason: HOSPADM

## 2023-02-18 RX ADMIN — LISINOPRIL 20 MG: 20 TABLET ORAL at 15:44

## 2023-02-18 RX ADMIN — FUROSEMIDE 80 MG: 10 INJECTION, SOLUTION INTRAMUSCULAR; INTRAVENOUS at 16:20

## 2023-02-18 RX ADMIN — ACETAMINOPHEN 650 MG: 325 TABLET, FILM COATED ORAL at 17:48

## 2023-02-18 RX ADMIN — DILTIAZEM HYDROCHLORIDE 10 MG: 5 INJECTION INTRAVENOUS at 20:12

## 2023-02-18 RX ADMIN — Medication 10 ML: at 20:13

## 2023-02-18 RX ADMIN — SODIUM CHLORIDE 100 ML/HR: 9 INJECTION, SOLUTION INTRAVENOUS at 16:20

## 2023-02-18 RX ADMIN — DILTIAZEM HYDROCHLORIDE 10 MG: 5 INJECTION, SOLUTION INTRAVENOUS at 15:43

## 2023-02-18 NOTE — ED NOTES
"Nursing report ED to floor  Marquise Short  42 y.o.  male    HPI:   Chief Complaint   Patient presents with    Shortness of Breath     Pt reports SOA, dizziness and states \"I can't stay awake\" for the past 2 days.  Pt reports hx of A fib.        Admitting doctor:   Cameron Maria MD    Admitting diagnosis:   The primary encounter diagnosis was Atrial fibrillation, unspecified type (HCC). Diagnoses of Hyponatremia, Elevated troponin I level, Dyspnea, unspecified type, and Acute congestive heart failure, unspecified heart failure type (HCC) were also pertinent to this visit.    Code status:   Current Code Status       Date Active Code Status Order ID Comments User Context       2/18/2023 1628 CPR (Attempt to Resuscitate) [id number]  Denice Whiteside APRN ED        Question Answer    Code Status (Patient has no pulse and is not breathing) CPR (Attempt to Resuscitate)    Medical Interventions (Patient has pulse or is breathing) Full Support    Level Of Support Discussed With Patient                    Allergies:   Patient has no known allergies.    Isolation:  No active isolations     Fall Risk:  Fall Risk Assessment was completed, and patient is at low risk for falls.   Predictive Model Details   No score data available for Risk of Fall        Weight:       02/18/23  1456   Weight: (!) 168 kg (369 lb 11.4 oz)       Intake and Output  No intake or output data in the 24 hours ending 02/18/23 1640    Diet:   Dietary Orders (From admission, onward)       Start     Ordered    02/19/23 0001  NPO Diet NPO Type: Sips with Meds  Diet Effective Midnight        Question:  NPO Type  Answer:  Sips with Meds    02/18/23 1629    02/18/23 1630  Diet: Cardiac Diets; Healthy Heart (2-3 Na+); Texture: Regular Texture (IDDSI 7); Fluid Consistency: Thin (IDDSI 0)  Diet Effective Now        References:    Diet Order [facility]   Question Answer Comment   Diets: Cardiac Diets    Cardiac Diet: Healthy Heart (2-3 Na+)    Texture: Regular Texture " "(IDDSI 7)    Fluid Consistency: Thin (IDDSI 0)        02/18/23 1629                     Most recent vitals:   Vitals:    02/18/23 1456 02/18/23 1459 02/18/23 1543 02/18/23 1600   BP:  (!) 163/110 (!) 158/109 (!) 152/106   BP Location:  Left arm Right arm    Patient Position:  Sitting Lying    Pulse: (!) 125  (!) 128 101   Resp: 18 18 18   Temp: 99 °F (37.2 °C)      TempSrc: Temporal      SpO2: 95%  95% 94%   Weight: (!) 168 kg (369 lb 11.4 oz)      Height: 182.9 cm (72\")          Active LDAs/IV Access:   Lines, Drains & Airways       Active LDAs       Name Placement date Placement time Site Days    Peripheral IV 02/18/23 1541 Left Antecubital 02/18/23  1541  Antecubital  less than 1                    Skin Condition:   Skin Assessments (last day)       None             Labs (abnormal labs have a star):   Labs Reviewed   COMPREHENSIVE METABOLIC PANEL - Abnormal; Notable for the following components:       Result Value    Glucose 100 (*)     Sodium 129 (*)     ALT (SGPT) 75 (*)     AST (SGOT) 60 (*)     All other components within normal limits    Narrative:     GFR Normal >60  Chronic Kidney Disease <60  Kidney Failure <15     BNP (IN-HOUSE) - Abnormal; Notable for the following components:    proBNP 5,020.0 (*)     All other components within normal limits    Narrative:     Among patients with dyspnea, NT-proBNP is highly sensitive for the detection of acute congestive heart failure. In addition NT-proBNP of <300 pg/ml effectively rules out acute congestive heart failure with 99% negative predictive value.    Results may be falsely decreased if patient taking Biotin.     TROPONIN - Abnormal; Notable for the following components:    HS Troponin T 26 (*)     All other components within normal limits    Narrative:     High Sensitive Troponin T Reference Range:  <10.0 ng/L- Negative Female for AMI  <15.0 ng/L- Negative Male for AMI  >=10 - Abnormal Female indicating possible myocardial injury.  >=15 - Abnormal Male " indicating possible myocardial injury.   Clinicians would have to utilize clinical acumen, EKG, Troponin, and serial changes to determine if it is an Acute Myocardial Infarction or myocardial injury due to an underlying chronic condition.        PROTIME-INR - Abnormal; Notable for the following components:    Protime 15.3 (*)     INR 1.52 (*)     All other components within normal limits   CBC WITH AUTO DIFFERENTIAL - Abnormal; Notable for the following components:    .5 (*)     MCH 34.2 (*)     RDW-SD 55.1 (*)     Lymphocyte % 10.6 (*)     Monocyte % 15.0 (*)     Eosinophil % 0.1 (*)     Monocytes, Absolute 1.00 (*)     All other components within normal limits   URINE DRUG SCREEN   HIGH SENSITIVITIY TROPONIN T 2HR   APTT   CBC AND DIFFERENTIAL    Narrative:     The following orders were created for panel order CBC & Differential.  Procedure                               Abnormality         Status                     ---------                               -----------         ------                     CBC Auto Differential[[id number]]        Abnormal            Final result                 Please view results for these tests on the individual orders.       LOC: Person, Place, Time, and Situation    Telemetry:  Observation Unit    Cardiac Monitoring Ordered: yes    EKG:   ECG 12 Lead Dyspnea   Preliminary Result   HEART RATE= 118  bpm   RR Interval= 509  ms   MD Interval=   ms   P Horizontal Axis=   deg   P Front Axis=   deg   QRSD Interval= 103  ms   QT Interval= 329  ms   QRS Axis= -5  deg   T Wave Axis= 75  deg   - ABNORMAL ECG -   Atrial fibrillation   Probable LVH with secondary repol abnrm   Electronically Signed By:    Date and Time of Study: 2023-02-18 15:06:39          Medications Given in the ED:   Medications   sodium chloride 0.9 % flush 10 mL (has no administration in time range)   sodium chloride 0.9 % infusion (100 mL/hr Intravenous New Bag 2/18/23 5212)   sodium chloride 0.9 % flush 10 mL (has  no administration in time range)   sodium chloride 0.9 % flush 10 mL (has no administration in time range)   sodium chloride 0.9 % infusion 40 mL (has no administration in time range)   acetaminophen (TYLENOL) tablet 650 mg (has no administration in time range)   polyethylene glycol (MIRALAX) packet 17 g (has no administration in time range)     And   bisacodyl (DULCOLAX) EC tablet 5 mg (has no administration in time range)     And   bisacodyl (DULCOLAX) suppository 10 mg (has no administration in time range)   ondansetron (ZOFRAN) tablet 4 mg (has no administration in time range)     Or   ondansetron (ZOFRAN) injection 4 mg (has no administration in time range)   dilTIAZem (CARDIZEM) injection 10 mg (10 mg Intravenous Given 2/18/23 1543)   lisinopril (PRINIVIL,ZESTRIL) tablet 20 mg (20 mg Oral Given 2/18/23 1544)   furosemide (LASIX) injection 80 mg (80 mg Intravenous Given 2/18/23 1620)       Imaging results:  XR Chest 1 View    Result Date: 2/18/2023  Impression: 1.Slight prominence of pulmonary vascular markings. In the proper clinical setting some vascular congestion could not be excluded. Electronically Signed: Leonardo Dobson  2/18/2023 4:20 PM EST  Workstation ID: [id number]     Social issues:   Social History     Socioeconomic History    Marital status: Single   Tobacco Use    Smoking status: Every Day     Years: 20.00     Types: Cigarettes    Smokeless tobacco: Current     Types: Chew       NIH Stroke Scale:  Interval: (not recorded)  1a. Level of Consciousness: (not recorded)  1b. LOC Questions: (not recorded)  1c. LOC Commands: (not recorded)  2. Best Gaze: (not recorded)  3. Visual: (not recorded)  4. Facial Palsy: (not recorded)  5a. Motor Arm, Left: (not recorded)  5b. Motor Arm, Right: (not recorded)  6a. Motor Leg, Left: (not recorded)  6b. Motor Leg, Right: (not recorded)  7. Limb Ataxia: (not recorded)  8. Sensory: (not recorded)  9. Best Language: (not recorded)  10. Dysarthria: (not recorded)  11.  Extinction and Inattention (formerly Neglect): (not recorded)    Total (NIH Stroke Scale): (not recorded)     Additional notable assessment information:     Nursing report ED to floor:  CELESTE Rodriguez RN   02/18/23 16:40 EST     Ear Wedge Repair Text: A wedge excision was completed by carrying down an excision through the full thickness of the ear and cartilage with an inward facing Burow's triangle. The wound was then closed in a layered fashion.

## 2023-02-18 NOTE — PLAN OF CARE
Goal Outcome Evaluation:  Plan of Care Reviewed With: patient        Progress: improving  Outcome Evaluation: Pt admitted for soa, cp, and currently in a-fib. No s/s of distress at this time. Will continue tomonitor.

## 2023-02-18 NOTE — ED PROVIDER NOTES
Subjective   History of Present Illness  Patient is a 42-year-old morbidly obese gentleman who comes in with fatigue and states that he has been extremely drowsy for the last 2 days.  He states that he also is having some shortness of breath.  He states that he did not have a proper follow-up with Dr. Kohler and ran out of his Xarelto lisinopril and carvedilol and made an appointment with him and they refilled the Xarelto but did not refill his other medication-he states he made an appointment with Dr. Kohler which is for April but he has had increased shortness of breath and increased fatigue which brought him to the emergency room today.  He denies any chest pain.  He denies any cough congestion fever chills        Review of Systems   Constitutional: Positive for fatigue. Negative for chills and fever.   HENT: Negative for congestion, tinnitus and trouble swallowing.    Eyes: Negative for photophobia, discharge and redness.   Respiratory: Positive for shortness of breath. Negative for cough.    Cardiovascular: Negative for chest pain and palpitations.   Gastrointestinal: Negative for abdominal pain, diarrhea, nausea and vomiting.   Genitourinary: Negative for dysuria, frequency and urgency.   Musculoskeletal: Negative for back pain, joint swelling and myalgias.   Skin: Negative for rash.   Neurological: Positive for weakness. Negative for dizziness and headaches.   Psychiatric/Behavioral: Negative for confusion.   All other systems reviewed and are negative.      Past Medical History:   Diagnosis Date   • A-fib (HCC)    • Cardiomyopathy (HCC)    • Hypertension        No Known Allergies    Past Surgical History:   Procedure Laterality Date   • CARDIOVERSION         Family History   Problem Relation Age of Onset   • Heart attack Paternal Grandfather        Social History     Socioeconomic History   • Marital status: Single   Tobacco Use   • Smoking status: Every Day     Years: 20.00     Types: Cigarettes   •  Smokeless tobacco: Current     Types: Chew           Objective   Physical Exam  Vitals reviewed.   Constitutional:       General: He is not in acute distress.     Appearance: He is well-developed. He is obese. He is not ill-appearing, toxic-appearing or diaphoretic.   HENT:      Head: Normocephalic and atraumatic.      Mouth/Throat:      Mouth: Mucous membranes are moist.   Eyes:      Conjunctiva/sclera: Conjunctivae normal.      Pupils: Pupils are equal, round, and reactive to light.   Cardiovascular:      Rate and Rhythm: Tachycardia present. Rhythm irregularly irregular.      Pulses:           Carotid pulses are 1+ on the right side and 1+ on the left side.       Radial pulses are 1+ on the right side and 1+ on the left side.        Femoral pulses are 1+ on the right side and 1+ on the left side.       Popliteal pulses are 1+ on the right side and 1+ on the left side.        Dorsalis pedis pulses are 1+ on the right side and 1+ on the left side.        Posterior tibial pulses are 1+ on the right side and 1+ on the left side.      Heart sounds: Normal heart sounds.   Pulmonary:      Effort: Pulmonary effort is normal.      Breath sounds: Examination of the right-lower field reveals decreased breath sounds and rales. Examination of the left-lower field reveals decreased breath sounds and rales. Decreased breath sounds and rales present.   Abdominal:      General: Bowel sounds are normal.      Palpations: Abdomen is soft.   Musculoskeletal:         General: Normal range of motion.      Cervical back: Normal range of motion and neck supple.      Right lower leg: No edema.      Left lower leg: No edema.   Skin:     General: Skin is warm and dry.   Neurological:      Mental Status: He is alert and oriented to person, place, and time.      GCS: GCS eye subscore is 4. GCS verbal subscore is 5. GCS motor subscore is 6.   Psychiatric:         Attention and Perception: Attention and perception normal.         Mood and  "Affect: Mood and affect normal.         Speech: Speech normal.         Behavior: Behavior normal. Behavior is cooperative.         Procedures       EKG shows atrial fibrillation with a rate of 118 no ectopy no ST elevation it was reviewed with previous dated 3/21/2019 which shows sinus rhythm at that time with a rate of 66 reviewed by myself read by Dr. Valle    ED Course  ED Course as of 02/18/23 1629   Sat Feb 18, 2023   1618 Patient discussed with Denice RONQUILLO in ED obs unit who assumes care   [KW]      ED Course User Index  [KW] Kirti Lpóez, APRN      BP (!) 152/106   Pulse 101   Temp 99 °F (37.2 °C) (Temporal)   Resp 18   Ht 182.9 cm (72\")   Wt (!) 168 kg (369 lb 11.4 oz)   SpO2 94%   BMI 50.14 kg/m²   Labs Reviewed   COMPREHENSIVE METABOLIC PANEL - Abnormal; Notable for the following components:       Result Value    Glucose 100 (*)     Sodium 129 (*)     ALT (SGPT) 75 (*)     AST (SGOT) 60 (*)     All other components within normal limits    Narrative:     GFR Normal >60  Chronic Kidney Disease <60  Kidney Failure <15     BNP (IN-HOUSE) - Abnormal; Notable for the following components:    proBNP 5,020.0 (*)     All other components within normal limits    Narrative:     Among patients with dyspnea, NT-proBNP is highly sensitive for the detection of acute congestive heart failure. In addition NT-proBNP of <300 pg/ml effectively rules out acute congestive heart failure with 99% negative predictive value.    Results may be falsely decreased if patient taking Biotin.     TROPONIN - Abnormal; Notable for the following components:    HS Troponin T 26 (*)     All other components within normal limits    Narrative:     High Sensitive Troponin T Reference Range:  <10.0 ng/L- Negative Female for AMI  <15.0 ng/L- Negative Male for AMI  >=10 - Abnormal Female indicating possible myocardial injury.  >=15 - Abnormal Male indicating possible myocardial injury.   Clinicians would have to utilize clinical " acumen, EKG, Troponin, and serial changes to determine if it is an Acute Myocardial Infarction or myocardial injury due to an underlying chronic condition.        PROTIME-INR - Abnormal; Notable for the following components:    Protime 15.3 (*)     INR 1.52 (*)     All other components within normal limits   CBC WITH AUTO DIFFERENTIAL - Abnormal; Notable for the following components:    .5 (*)     MCH 34.2 (*)     RDW-SD 55.1 (*)     Lymphocyte % 10.6 (*)     Monocyte % 15.0 (*)     Eosinophil % 0.1 (*)     Monocytes, Absolute 1.00 (*)     All other components within normal limits   URINE DRUG SCREEN   HIGH SENSITIVITIY TROPONIN T 2HR   APTT   CBC AND DIFFERENTIAL    Narrative:     The following orders were created for panel order CBC & Differential.  Procedure                               Abnormality         Status                     ---------                               -----------         ------                     CBC Auto Differential[[id number]]        Abnormal            Final result                 Please view results for these tests on the individual orders.     Medications   sodium chloride 0.9 % flush 10 mL (has no administration in time range)   sodium chloride 0.9 % infusion (100 mL/hr Intravenous New Bag 2/18/23 1620)   sodium chloride 0.9 % flush 10 mL (has no administration in time range)   sodium chloride 0.9 % flush 10 mL (has no administration in time range)   sodium chloride 0.9 % infusion 40 mL (has no administration in time range)   acetaminophen (TYLENOL) tablet 650 mg (has no administration in time range)   polyethylene glycol (MIRALAX) packet 17 g (has no administration in time range)     And   bisacodyl (DULCOLAX) EC tablet 5 mg (has no administration in time range)     And   bisacodyl (DULCOLAX) suppository 10 mg (has no administration in time range)   ondansetron (ZOFRAN) tablet 4 mg (has no administration in time range)     Or   ondansetron (ZOFRAN) injection 4 mg (has no  "administration in time range)   dilTIAZem (CARDIZEM) injection 10 mg (10 mg Intravenous Given 2/18/23 1543)   lisinopril (PRINIVIL,ZESTRIL) tablet 20 mg (20 mg Oral Given 2/18/23 1544)   furosemide (LASIX) injection 80 mg (80 mg Intravenous Given 2/18/23 1620)          Chest x-ray reviewed by myself and read by radiology                                Medical Decision Making  BP (!) 152/106   Pulse 101   Temp 99 °F (37.2 °C) (Temporal)   Resp 18   Ht 182.9 cm (72\")   Wt (!) 168 kg (369 lb 11.4 oz)   SpO2 94%   BMI 50.14 kg/m²      Chart review: Chart review shows where the patient had contacted Dr. Kohler's office and requested medication refills but the patient required an appointment the patient was contacted and appointment was made for April and his Xarelto was refilled but his other medications to include lisinopril carvedilol andTikosyn were not refilled-patient states he does not have a primary care provider he just sees Dr. Kohler      Comorbidities:  has a past medical history of A-fib (HCC), Cardiomyopathy (HCC), and Hypertension.        Radiology interpretation:  X-rays reviewed independently by me and interpreted by radiologist,  XR Chest 1 View   Final Result    Impression:    1.Slight prominence of pulmonary vascular markings. In the proper clinical setting some vascular congestion could not be excluded.        Electronically Signed: Leonardo Dobson      2/18/2023 4:20 PM EST      Workstation ID: [id number]           Lab interpretation:  Labs all viewed by me and significant for, sodium of 129 this in consideration with his BNP of 5020 -patient's troponin was also elevated at 26.         - risk of complications   - social determinants:        - benefits of admission vs discharge: Patient will be placed in the ED observation unit overnight for diuresis as well as rhythm control-consult will be placed to Dr. Kohler for in the morning for continued management of atrial fibrillation which the " patient has had cardioversion in the past-patient was agreeable to this plan of care had improvement of symptoms throughout his emergency room stay and was placed in the ED observation unit       - patient refusal of studies/treatments:  n/a   - surgery:  n/a   -    Course: Patient had IV established and blood work was obtained.  Patient was given his home dose of lisinopril 20 for his blood pressure which was elevated here in the emergency room.  He was found to have a sodium of 129 and a BNP of 5020 the patient was subsequently given 80 mg of Lasix IV as well as started on some normal saline for intracellular hydration at 100 mL an hour.  The patient's blood pressure improved throughout his emergency room stay he was also given Cardizem just a 10 mg bolus which helped his rate reduced from the 125-135 to around 100.-The patient was stable at the time of placement in the ED observation unit and the patient was agreeable to this plan of care      Appropriate PPE worn during exam.      Discussed care with: Denice RONQUILLO ED observation who assumed care      Acute congestive heart failure, unspecified heart failure type (HCC): complicated acute illness or injury  Atrial fibrillation, unspecified type (HCC): complicated acute illness or injury  Dyspnea, unspecified type: chronic illness or injury  Elevated troponin I level: complicated acute illness or injury  Hyponatremia: complicated acute illness or injury  Amount and/or Complexity of Data Reviewed  External Data Reviewed: notes.     Details: Reviewed notes from phone conversations with Dr. Kohler's office concerning prescription  Labs: ordered. Decision-making details documented in ED Course.  Radiology: ordered. Decision-making details documented in ED Course.  ECG/medicine tests: ordered and independent interpretation performed. Decision-making details documented in ED Course.      Risk  Prescription drug management.  Decision regarding  hospitalization.          Final diagnoses:   Atrial fibrillation, unspecified type (HCC)   Hyponatremia   Elevated troponin I level   Dyspnea, unspecified type   Acute congestive heart failure, unspecified heart failure type (HCC)       ED Disposition  ED Disposition     ED Disposition   Decision to Admit    Condition   --    Comment   --             No follow-up provider specified.       Medication List      No changes were made to your prescriptions during this visit.          Kirti López, APRN  02/18/23 5962

## 2023-02-19 ENCOUNTER — APPOINTMENT (OUTPATIENT)
Dept: CARDIOLOGY | Facility: HOSPITAL | Age: 43
End: 2023-02-19
Payer: COMMERCIAL

## 2023-02-19 PROBLEM — I42.8 NICM (NONISCHEMIC CARDIOMYOPATHY) (HCC): Status: ACTIVE | Noted: 2023-02-19

## 2023-02-19 PROBLEM — I48.19 ATRIAL FIBRILLATION, PERSISTENT: Status: ACTIVE | Noted: 2023-02-19

## 2023-02-19 LAB
ALBUMIN SERPL-MCNC: 3.8 G/DL (ref 3.5–5.2)
ALBUMIN/GLOB SERPL: 1 G/DL
ALP SERPL-CCNC: 77 U/L (ref 39–117)
ALT SERPL W P-5'-P-CCNC: 94 U/L (ref 1–41)
ANION GAP SERPL CALCULATED.3IONS-SCNC: 11 MMOL/L (ref 5–15)
AST SERPL-CCNC: 80 U/L (ref 1–40)
BASOPHILS # BLD AUTO: 0 10*3/MM3 (ref 0–0.2)
BASOPHILS NFR BLD AUTO: 0.6 % (ref 0–1.5)
BH CV ECHO MEAS - ACS: 2.6 CM
BH CV ECHO MEAS - AO ROOT DIAM: 3.5 CM
BH CV ECHO MEAS - EDV(CUBED): 157.5 ML
BH CV ECHO MEAS - EDV(MOD-SP4): 122 ML
BH CV ECHO MEAS - EF(MOD-BP): 42 %
BH CV ECHO MEAS - EF(MOD-SP4): 42.7 %
BH CV ECHO MEAS - ESV(CUBED): 85.2 ML
BH CV ECHO MEAS - ESV(MOD-SP4): 69.9 ML
BH CV ECHO MEAS - FS: 18.5 %
BH CV ECHO MEAS - IVS/LVPW: 0.86 CM
BH CV ECHO MEAS - IVSD: 1.8 CM
BH CV ECHO MEAS - LA DIMENSION: 4.2 CM
BH CV ECHO MEAS - LV DIASTOLIC VOL/BSA (35-75): 44.2 CM2
BH CV ECHO MEAS - LV MASS(C)D: 538.8 GRAMS
BH CV ECHO MEAS - LV SYSTOLIC VOL/BSA (12-30): 25.3 CM2
BH CV ECHO MEAS - LVIDD: 5.4 CM
BH CV ECHO MEAS - LVIDS: 4.4 CM
BH CV ECHO MEAS - LVPWD: 2.1 CM
BH CV ECHO MEAS - SI(MOD-SP4): 18.9 ML/M2
BH CV ECHO MEAS - SV(MOD-SP4): 52.1 ML
BILIRUB SERPL-MCNC: 0.6 MG/DL (ref 0–1.2)
BUN SERPL-MCNC: 14 MG/DL (ref 6–20)
BUN/CREAT SERPL: 14.4 (ref 7–25)
CALCIUM SPEC-SCNC: 8.7 MG/DL (ref 8.6–10.5)
CHLORIDE SERPL-SCNC: 104 MMOL/L (ref 98–107)
CO2 SERPL-SCNC: 22 MMOL/L (ref 22–29)
CREAT SERPL-MCNC: 0.97 MG/DL (ref 0.76–1.27)
DEPRECATED RDW RBC AUTO: 54.3 FL (ref 37–54)
EGFRCR SERPLBLD CKD-EPI 2021: 101.2 ML/MIN/1.73
EOSINOPHIL # BLD AUTO: 0 10*3/MM3 (ref 0–0.4)
EOSINOPHIL NFR BLD AUTO: 0.1 % (ref 0.3–6.2)
ERYTHROCYTE [DISTWIDTH] IN BLOOD BY AUTOMATED COUNT: 15.1 % (ref 12.3–15.4)
GLOBULIN UR ELPH-MCNC: 3.9 GM/DL
GLUCOSE SERPL-MCNC: 93 MG/DL (ref 65–99)
HCT VFR BLD AUTO: 48.5 % (ref 37.5–51)
HGB BLD-MCNC: 15.8 G/DL (ref 13–17.7)
LYMPHOCYTES # BLD AUTO: 1.1 10*3/MM3 (ref 0.7–3.1)
LYMPHOCYTES NFR BLD AUTO: 19.2 % (ref 19.6–45.3)
MAXIMAL PREDICTED HEART RATE: 180 BPM
MCH RBC QN AUTO: 33.4 PG (ref 26.6–33)
MCHC RBC AUTO-ENTMCNC: 32.6 G/DL (ref 31.5–35.7)
MCV RBC AUTO: 102.5 FL (ref 79–97)
MONOCYTES # BLD AUTO: 1 10*3/MM3 (ref 0.1–0.9)
MONOCYTES NFR BLD AUTO: 17.1 % (ref 5–12)
NEUTROPHILS NFR BLD AUTO: 3.7 10*3/MM3 (ref 1.7–7)
NEUTROPHILS NFR BLD AUTO: 63 % (ref 42.7–76)
NRBC BLD AUTO-RTO: 0.1 /100 WBC (ref 0–0.2)
PLATELET # BLD AUTO: 162 10*3/MM3 (ref 140–450)
PMV BLD AUTO: 8.5 FL (ref 6–12)
POTASSIUM SERPL-SCNC: 4.1 MMOL/L (ref 3.5–5.2)
PROT SERPL-MCNC: 7.7 G/DL (ref 6–8.5)
QT INTERVAL: 329 MS
RBC # BLD AUTO: 4.73 10*6/MM3 (ref 4.14–5.8)
SODIUM SERPL-SCNC: 137 MMOL/L (ref 136–145)
STRESS TARGET HR: 153 BPM
WBC NRBC COR # BLD: 5.9 10*3/MM3 (ref 3.4–10.8)

## 2023-02-19 PROCEDURE — 25010000002 SULFUR HEXAFLUORIDE MICROSPH 60.7-25 MG RECONSTITUTED SUSPENSION: Performed by: EMERGENCY MEDICINE

## 2023-02-19 PROCEDURE — 99214 OFFICE O/P EST MOD 30 MIN: CPT | Performed by: INTERNAL MEDICINE

## 2023-02-19 PROCEDURE — 80053 COMPREHEN METABOLIC PANEL: CPT | Performed by: NURSE PRACTITIONER

## 2023-02-19 PROCEDURE — 93321 DOPPLER ECHO F-UP/LMTD STD: CPT

## 2023-02-19 PROCEDURE — 93005 ELECTROCARDIOGRAM TRACING: CPT | Performed by: NURSE PRACTITIONER

## 2023-02-19 PROCEDURE — 93325 DOPPLER ECHO COLOR FLOW MAPG: CPT | Performed by: INTERNAL MEDICINE

## 2023-02-19 PROCEDURE — 96376 TX/PRO/DX INJ SAME DRUG ADON: CPT

## 2023-02-19 PROCEDURE — 93308 TTE F-UP OR LMTD: CPT | Performed by: INTERNAL MEDICINE

## 2023-02-19 PROCEDURE — 93010 ELECTROCARDIOGRAM REPORT: CPT | Performed by: INTERNAL MEDICINE

## 2023-02-19 PROCEDURE — G0378 HOSPITAL OBSERVATION PER HR: HCPCS

## 2023-02-19 PROCEDURE — 93308 TTE F-UP OR LMTD: CPT

## 2023-02-19 PROCEDURE — 93321 DOPPLER ECHO F-UP/LMTD STD: CPT | Performed by: INTERNAL MEDICINE

## 2023-02-19 PROCEDURE — 25010000002 FUROSEMIDE PER 20 MG: Performed by: NURSE PRACTITIONER

## 2023-02-19 PROCEDURE — 93325 DOPPLER ECHO COLOR FLOW MAPG: CPT

## 2023-02-19 PROCEDURE — 85025 COMPLETE CBC W/AUTO DIFF WBC: CPT | Performed by: NURSE PRACTITIONER

## 2023-02-19 RX ORDER — HYDRALAZINE HYDROCHLORIDE 20 MG/ML
10 INJECTION INTRAMUSCULAR; INTRAVENOUS EVERY 6 HOURS PRN
Status: DISCONTINUED | OUTPATIENT
Start: 2023-02-19 | End: 2023-02-20 | Stop reason: HOSPADM

## 2023-02-19 RX ADMIN — FUROSEMIDE 40 MG: 10 INJECTION, SOLUTION INTRAMUSCULAR; INTRAVENOUS at 09:30

## 2023-02-19 RX ADMIN — CARVEDILOL 12.5 MG: 6.25 TABLET, FILM COATED ORAL at 09:30

## 2023-02-19 RX ADMIN — RIVAROXABAN 20 MG: 20 TABLET, FILM COATED ORAL at 18:30

## 2023-02-19 RX ADMIN — SULFUR HEXAFLUORIDE 2 ML: KIT at 11:49

## 2023-02-19 RX ADMIN — SPIRONOLACTONE 50 MG: 25 TABLET ORAL at 09:30

## 2023-02-19 RX ADMIN — Medication 10 ML: at 09:30

## 2023-02-19 RX ADMIN — SODIUM CHLORIDE 100 ML/HR: 9 INJECTION, SOLUTION INTRAVENOUS at 05:52

## 2023-02-19 NOTE — CONSULTS
" [facility]      Name: Marquise Short ADMIT: 2023   : 1980  PCP: Provider, No Known    MRN: [id number] LOS: 0 days   AGE/SEX: 42 y.o. male  ROOM: 115/1     Chief Complaint   Patient presents with   • Shortness of Breath     Pt reports SOA, dizziness and states \"I can't stay awake\" for the past 2 days.  Pt reports hx of A fib.        Subjective        History of present illness  Marquise Short is a 42-year-old male patient was history of atrial fibrillation, cardiomyopathy which seems to be nonischemic, alcohol abuse, obstructive sleep apnea not on CPAP presented to the hospital with complaints of shortness of breath and drowsiness.  Patient denies having any chest pain, he does complain of shortness of breath but his main complaint was inability to stay awake and somewhat of a disorientation.    Past Medical History:   Diagnosis Date   • A-fib (HCC)    • Cardiomyopathy (HCC)    • Hypertension      Past Surgical History:   Procedure Laterality Date   • CARDIOVERSION       Family History   Problem Relation Age of Onset   • Heart attack Paternal Grandfather      Social History     Tobacco Use   • Smoking status: Every Day     Years: 20.00     Types: Cigarettes   • Smokeless tobacco: Current     Types: Chew   Vaping Use   • Vaping Use: Never used   Substance Use Topics   • Alcohol use: Never   • Drug use: Never     Medications Prior to Admission   Medication Sig Dispense Refill Last Dose   • carvedilol (COREG) 12.5 MG tablet Take 1 tablet by mouth Daily. 30 tablet 1    • rivaroxaban (Xarelto) 20 MG tablet Take 1 tablet by mouth Daily. 30 tablet 1    • spironolactone (ALDACTONE) 25 MG tablet Take 2 tablets by mouth Daily. 60 tablet 1      Allergies:  Patient has no known allergies.    Review of systems    Constitutional: Negative.    Respiratory and cardiovascular: As detailed in HPI section.  Gastrointestinal: Negative for constipation, nausea and vomiting negative for abdominal distention, abdominal pain and " diarrhea.   Genitourinary: Negative for difficulty urinating and flank pain.   Musculoskeletal: Negative for arthralgias, joint swelling and myalgias.   Skin: Negative for color change, rash and wound.   Neurological: Negative for dizziness, syncope, weakness and headaches.   Hematological: Negative for adenopathy.   Psychiatric/Behavioral: Negative for confusion.   All other systems reviewed and are negative.       Physical Exam  VITALS REVIEWED    General:      well developed, in no acute distress.    Head:      normocephalic and atraumatic.    Eyes:      PERRL/EOM intact, conjunctiva and sclera clear with out nystagmus.    Neck:      no masses, thyromegaly,  trachea central with normal respiratory effort and PMI displaced laterally  Lungs:      Clear to auscultation bilaterally  Heart:       Irregular rhythm  Msk:      no deformity or scoliosis noted of thoracic or lumbar spine.    Pulses:      pulses normal in all 4 extremities.    Extremities:       No lower extremity edema  Neurologic:      no focal deficits.   alert oriented x3  Skin:      intact without lesions or rashes.    Psych:      alert and cooperative; normal mood and affect; normal attention span and concentration.      Result Review :               Pertinent cardiac workup    1. EKG 2/19/2023 atrial fibrillation  2. EKG 2/3/2020 sinus rhythm.        Assessment and Plan         Dyspnea    NICM (nonischemic cardiomyopathy) (HCC)    Atrial fibrillation, persistent (HCC)      Marquise Short is a 42-year-old male patient who has obstructive sleep apnea, not on CPAP, persistent atrial fibrillation, had AZALIA guided cardioversion in June 2018 and January 2019, at some point he was on Tikosyn which was discontinued.  Patient also has history of cardiomyopathy with ejection fraction down to 30% based on records.  Most likely either due to atrial fibrillation, or alcoholism or a combination of both.  Patient has been on carvedilol, Xarelto and spironolactone,  but based on Dr. Kohler's notes he has not had regular follow-ups.  His troponin level is only slightly elevated, however his BNP is 5000.  I suspect that the patient is not CHF.  I would like to get an echocardiogram to reassess his ejection fraction.  He is also in atrial fibrillation but his rate is controlled.  I had a long discussion with the patient about the importance of sleep apnea management as well as abstinence for alcohol use.  Rhythm control for A-fib certainly should be attempted given his young age, however if he continues to drink alcohol then this may not be successful.  We will reassess after echocardiogram is done.        No follow-ups on file.  Patient was given instructions and counseling regarding his condition or for health maintenance advice. Please see specific information pulled into the AVS if appropriate.

## 2023-02-19 NOTE — PLAN OF CARE
Problem: Adult Inpatient Plan of Care  Goal: Absence of Hospital-Acquired Illness or Injury  Intervention: Identify and Manage Fall Risk  Recent Flowsheet Documentation  Taken 2/19/2023 0210 by Halina Cope RN  Safety Promotion/Fall Prevention: safety round/check completed  Taken 2/19/2023 0010 by Halina Cope RN  Safety Promotion/Fall Prevention: safety round/check completed  Taken 2/18/2023 2200 by Halina Cope RN  Safety Promotion/Fall Prevention: safety round/check completed  Taken 2/18/2023 2015 by Halina Cope RN  Safety Promotion/Fall Prevention: safety round/check completed  Intervention: Prevent Skin Injury  Recent Flowsheet Documentation  Taken 2/18/2023 2015 by Halina Cope RN  Body Position: position changed independently  Skin Protection: adhesive use limited  Intervention: Prevent and Manage VTE (Venous Thromboembolism) Risk  Recent Flowsheet Documentation  Taken 2/18/2023 2015 by Halina Cope RN  Activity Management:   activity adjusted per tolerance   back to bed  Intervention: Prevent Infection  Recent Flowsheet Documentation  Taken 2/18/2023 2015 by Halina Cope RN  Infection Prevention:   rest/sleep promoted   single patient room provided  Goal: Optimal Comfort and Wellbeing  Intervention: Provide Person-Centered Care  Recent Flowsheet Documentation  Taken 2/18/2023 2015 by Halina Cope RN  Trust Relationship/Rapport:   care explained   choices provided   emotional support provided   empathic listening provided   questions answered   questions encouraged   reassurance provided   thoughts/feelings acknowledged     Problem: Heart Failure Comorbidity  Goal: Maintenance of Heart Failure Symptom Control  Intervention: Maintain Heart Failure-Management  Recent Flowsheet Documentation  Taken 2/18/2023 2015 by Halina Cope RN  Medication Review/Management: medications reviewed     Problem: Hypertension Comorbidity  Goal: Blood Pressure in Desired  Range  Intervention: Maintain Blood Pressure Management  Recent Flowsheet Documentation  Taken 2/18/2023 2015 by Halina Cope, RN  Medication Review/Management: medications reviewed   Goal Outcome Evaluation:   Pt has had no complaints through the night. He has been sleeping throughout the entire shift, only waking up when interrupted. Has been npo since mn. Cardio consult this am. Pt sees alaina.

## 2023-02-19 NOTE — H&P
"[facility] H&P    Patient Name: Marquise Short  : 1980  MRN: [id number]  Primary Care Physician: Provider, No Known  Date of admission: 2023     Patient Care Team:  Provider, No Known as PCP - Davon Mahmood MD as Consulting Physician (Cardiology)          Subjective   History Present Illness     Chief Complaint:   Chief Complaint   Patient presents with   • Shortness of Breath     Pt reports SOA, dizziness and states \"I can't stay awake\" for the past 2 days.  Pt reports hx of A fib.      Dyspnea     Mr. Short is a 42 y.o.  presents to [facility] complaining of dyspnea      History of Present Illness    ED 23: Patient is a 42-year-old morbidly obese gentleman who comes in with fatigue and states that he has been extremely drowsy for the last 2 days.  He states that he also is having some shortness of breath.  He states that he did not have a proper follow-up with Dr. Kohler and ran out of his Xarelto lisinopril and carvedilol and made an appointment with him and they refilled the Xarelto but did not refill his other medication-he states he made an appointment with Dr. Kohler which is for April but he has had increased shortness of breath and increased fatigue which brought him to the emergency room today.  He denies any chest pain.  He denies any cough congestion fever chills    OBS 23: Patient is a 42-year-old male presented to the hospital with fatigue and dyspnea that is worsened over the past 2 days.  Patient states that he cannot stay awake.  Patient states he is ran out of his cardiac medication and has not had an appointment with cardiologist, Dr. Kohler, in some time.  Patient reports some chills and body aches and fatigue but feels like the symptoms are related to his heart failure.  Patient states that he has not been compliant with medication.    Review of Systems   Constitutional: Positive for malaise/fatigue.   HENT: Negative.  "   Eyes: Negative.    Cardiovascular: Positive for chest pain and dyspnea on exertion.   Respiratory: Positive for shortness of breath.    Endocrine: Negative.    Hematologic/Lymphatic: Negative.    Skin: Negative.    Musculoskeletal: Negative.    Gastrointestinal: Negative.    Genitourinary: Negative.    Neurological: Positive for weakness.   Psychiatric/Behavioral: Negative.    Allergic/Immunologic: Negative.            Personal History     Past Medical History:   Past Medical History:   Diagnosis Date   • A-fib (HCC)    • Cardiomyopathy (HCC)    • Hypertension        Surgical History:      Past Surgical History:   Procedure Laterality Date   • CARDIOVERSION             Family History: family history includes Heart attack in his paternal grandfather. Otherwise pertinent FHx was reviewed and unremarkable.     Social History:  reports that he has been smoking cigarettes. His smokeless tobacco use includes chew. He reports that he does not drink alcohol and does not use drugs.      Medications:  Prior to Admission medications    Medication Sig Start Date End Date Taking? Authorizing Provider   carvedilol (COREG) 12.5 MG tablet Take 1 tablet by mouth Daily. 1/30/23  Yes Davon Kohler MD   rivaroxaban (Xarelto) 20 MG tablet Take 1 tablet by mouth Daily. 1/30/23  Yes Davon Kohler MD   spironolactone (ALDACTONE) 25 MG tablet Take 2 tablets by mouth Daily. 1/30/23  Yes Davon Kohler MD       Allergies:  No Known Allergies    Objective   Objective     Vital Signs  Temp:  [96.7 °F (35.9 °C)-100.5 °F (38.1 °C)] 97.6 °F (36.4 °C)  Heart Rate:  [] 84  Resp:  [15-20] 20  BP: (107-194)/() 107/77  SpO2:  [94 %-99 %] 94 %  on  Flow (L/min):  [2] 2;   Device (Oxygen Therapy): room air  Body mass index is 49.88 kg/m².    Physical Exam  Vitals and nursing note reviewed.   Constitutional:       Appearance: He is obese.   HENT:      Head: Normocephalic and atraumatic.      Right Ear:  External ear normal.      Left Ear: External ear normal.      Nose: Nose normal.      Mouth/Throat:      Mouth: Mucous membranes are moist.      Pharynx: Oropharynx is clear.   Eyes:      Extraocular Movements: Extraocular movements intact.      Conjunctiva/sclera: Conjunctivae normal.      Pupils: Pupils are equal, round, and reactive to light.   Cardiovascular:      Rate and Rhythm: Normal rate. Rhythm irregular.      Pulses: Normal pulses.      Heart sounds: Normal heart sounds.   Pulmonary:      Effort: Pulmonary effort is normal.      Breath sounds: Rhonchi present.   Abdominal:      General: Bowel sounds are normal.      Palpations: Abdomen is soft.   Musculoskeletal:         General: Normal range of motion.      Cervical back: Normal range of motion.   Skin:     General: Skin is warm.      Capillary Refill: Capillary refill takes less than 2 seconds.   Neurological:      General: No focal deficit present.      Mental Status: He is alert and oriented to person, place, and time.   Psychiatric:         Mood and Affect: Mood normal.         Behavior: Behavior normal.         Thought Content: Thought content normal.         Judgment: Judgment normal.           Results Review:  I have personally reviewed most recent cardiac tracings, lab results, microbiology results and radiology images and interpretations and agree with findings, most notably: CBC, CMP, troponin, lactate, osmolality, urinalysis, blood culture, respiratory, EKG, echocardiogram.    Results from last 7 days   Lab Units 02/19/23  0849 02/18/23  1540   WBC 10*3/mm3 5.90 6.40   HEMOGLOBIN g/dL 15.8 14.3   HEMATOCRIT % 48.5 42.1   PLATELETS 10*3/mm3 162 163   INR   --  1.52*     Results from last 7 days   Lab Units 02/19/23  0849 02/18/23  2233 02/18/23  1842 02/18/23  1745 02/18/23  1540   SODIUM mmol/L 137  --   --   --  129*   POTASSIUM mmol/L 4.1  --   --   --  4.1   CHLORIDE mmol/L 104  --   --   --  98   CO2 mmol/L 22.0  --   --   --  22.0   BUN  mg/dL 14  --   --   --  9   CREATININE mg/dL 0.97  --   --   --  1.02   GLUCOSE mg/dL 93  --   --   --  100*   CALCIUM mg/dL 8.7  --   --   --  8.6   ALT (SGPT) U/L 94*  --   --   --  75*   AST (SGOT) U/L 80*  --   --   --  60*   HSTROP T ng/L  --  30*  --  31* 26*   PROBNP pg/mL  --   --   --   --  5,020.0*   LACTATE mmol/L  --   --  1.2  --   --      Estimated Creatinine Clearance: 161.8 mL/min (by C-G formula based on SCr of 0.97 mg/dL).  Brief Urine Lab Results  (Last result in the past 365 days)      Color   Clarity   Blood   Leuk Est   Nitrite   Protein   CREAT   Urine HCG        02/18/23 1637 Yellow   Clear   Small (1+)   Negative   Negative   30 mg/dL (1+)                 Microbiology Results (last 10 days)     Procedure Component Value - Date/Time    Respiratory Panel PCR w/COVID-19(SARS-CoV-2) J LUIS/AUDREY/ADRIANA/PAD/COR/MAD/SHREE In-House, NP Swab in UTM/VTM, 3-4 HR TAT - Swab, Nasopharynx [[id number]]  (Abnormal) Collected: 02/18/23 1745    Lab Status: Final result Specimen: Swab from Nasopharynx Updated: 02/18/23 1904     ADENOVIRUS, PCR Not Detected     Coronavirus 229E Not Detected     Coronavirus HKU1 Not Detected     Coronavirus NL63 Not Detected     Coronavirus OC43 Not Detected     COVID19 Detected     Human Metapneumovirus Not Detected     Human Rhinovirus/Enterovirus Not Detected     Influenza A PCR Not Detected     Influenza B PCR Not Detected     Parainfluenza Virus 1 Not Detected     Parainfluenza Virus 2 Not Detected     Parainfluenza Virus 3 Not Detected     Parainfluenza Virus 4 Not Detected     RSV, PCR Not Detected     Bordetella pertussis pcr Not Detected     Bordetella parapertussis PCR Not Detected     Chlamydophila pneumoniae PCR Not Detected     Mycoplasma pneumo by PCR Not Detected    Narrative:      In the setting of a positive respiratory panel with a viral infection PLUS a negative procalcitonin without other underlying concern for bacterial infection, consider observing off antibiotics or  discontinuation of antibiotics and continue supportive care. If the respiratory panel is positive for atypical bacterial infection (Bordetella pertussis, Chlamydophila pneumoniae, or Mycoplasma pneumoniae), consider antibiotic de-escalation to target atypical bacterial infection.          ECG/EMG Results (most recent)     Procedure Component Value Units Date/Time    ECG 12 Lead Dyspnea [[id number]] Collected: 02/18/23 1506     Updated: 02/19/23 0854     QT Interval 329 ms     Narrative:      HEART RATE= 118  bpm  RR Interval= 509  ms  AK Interval=   ms  P Horizontal Axis=   deg  P Front Axis=   deg  QRSD Interval= 103  ms  QT Interval= 329  ms  QRS Axis= -5  deg  T Wave Axis= 75  deg  - ABNORMAL ECG -  Atrial fibrillation  Probable LVH with secondary repol abnrm  When compared with ECG of 21-Mar-2019 9:31:43,  Significant change in rhythm: previously sinus  Significant repolarization change  Significant axis, voltage or hypertrophy change  Electronically Signed By: Acosta Valle ([facility]) 19-Feb-2023 08:54:05  Date and Time of Study: 2023-02-18 15:06:39    ECG 12 Lead Rhythm Change [[id number]] Collected: 02/19/23 0852     Updated: 02/19/23 0854     QT Interval 433 ms     Narrative:      HEART RATE= 97  bpm  RR Interval= 617  ms  AK Interval=   ms  P Horizontal Axis=   deg  P Front Axis=   deg  QRSD Interval= 93  ms  QT Interval= 433  ms  QRS Axis= 8  deg  T Wave Axis= 68  deg  - ABNORMAL ECG -  Atrial fibrillation  Prolonged QT interval  Electronically Signed By:   Date and Time of Study: 2023-02-19 08:52:34    Adult Transthoracic Echo Limited W/ Cont if Necessary Per Protocol [[id number]] Resulted: 02/19/23 1213     Updated: 02/19/23 1213     Target HR (85%) 153 bpm      Max. Pred. HR (100%) 180 bpm      LVIDd 5.4 cm      LVIDs 4.4 cm      IVSd 1.80 cm      LVPWd 2.10 cm      FS 18.5 %      IVS/LVPW 0.86 cm      ESV(cubed) 85.2 ml      LV Sys Vol (BSA corrected) 25.3 cm2      EDV(cubed) 157.5 ml      LV Escobar Vol (BSA  corrected) 44.2 cm2      LV mass(C)d 538.8 grams      EDV(MOD-sp4) 122.0 ml      ESV(MOD-sp4) 69.9 ml      SV(MOD-sp4) 52.1 ml      SI(MOD-sp4) 18.9 ml/m2      EF(MOD-sp4) 42.7 %      LA dimension (2D)  4.2 cm      Ao root diam 3.5 cm      ACS 2.6 cm      EF(MOD-bp) 42.0 %     Narrative:      •  Left ventricular ejection fraction appears to be 26 - 30%.  •  The left ventricular cavity is dilated.  •  Left ventricular diastolic dysfunction is noted.  •  The right atrial cavity is mildly  dilated.                Results for orders placed during the hospital encounter of 02/18/23    Adult Transthoracic Echo Limited W/ Cont if Necessary Per Protocol    Interpretation Summary  •  Left ventricular ejection fraction appears to be 26 - 30%.  •  The left ventricular cavity is dilated.  •  Left ventricular diastolic dysfunction is noted.  •  The right atrial cavity is mildly  dilated.      XR Chest 1 View    Result Date: 2/18/2023  Impression: 1.Slight prominence of pulmonary vascular markings. In the proper clinical setting some vascular congestion could not be excluded. Electronically Signed: Leonardo Dobson  2/18/2023 4:20 PM EST  Workstation ID: [id number]        Estimated Creatinine Clearance: 161.8 mL/min (by C-G formula based on SCr of 0.97 mg/dL).    Assessment & Plan   Assessment/Plan       Active Hospital Problems    Diagnosis  POA   • **Dyspnea [R06.00]  Yes   • NICM (nonischemic cardiomyopathy) (HCC) [I42.8]  Unknown   • Atrial fibrillation, persistent (HCC) [I48.19]  Unknown      Resolved Hospital Problems   No resolved problems to display.     Dyspnea  - History of cardiomyopathy, hypertension, atrial fibrillation, heart failure  -Continue carvedilol, Xarelto, and spironolactone  -IV hydralazine as needed for systolic over 160  -Continuous cardiac monitoring  -Start Entresto  -Strict I&O's  -Continue diuresis  -EKG shows atrial fibrillation prolonged QT interval heart rate 97  -Echocardiogram showed LVEF of 26 to 30%  with dilated left ventricle and right atrial cavities  -High-sensitivity troponin 31 and repeat of 30, troponin T delta 5  -Cardiology consult    Hyponatremia  -Total IV fluid  -Sodium 130 upon admission with sodium 137 today    COVID-19 infection  -Continue IV fluid  -Isolation precautions maintain  -Respiratory panel positive for COVID  -WBC within normal limits, monitor trend  -Blood cultures pending  -Tylenol and ibuprofen as needed for fever    Transaminitis  -ALT 94, AST 80, alkaline phosphate 77, monitor trend      VTE Prophylaxis -   Mechanical Order History:      Ordered        02/18/23 1629  Place Sequential Compression Device  Once            02/18/23 1629  Maintain Sequential Compression Device  Continuous                    Pharmalogical Order History:      Ordered     Dose Route Frequency Stop    02/19/23 1516  rivaroxaban (XARELTO) tablet 20 mg        Question:  Are you ordering rivaroxaban 10 mg for the prevention of blood clots in an acutely ill medical patient?  Answer:  No    20 mg PO Daily With Dinner --                CODE STATUS:    Code Status and Medical Interventions:   Ordered at: 02/18/23 1628     Level Of Support Discussed With:    Patient     Code Status (Patient has no pulse and is not breathing):    CPR (Attempt to Resuscitate)     Medical Interventions (Patient has pulse or is breathing):    Full Support       This patient has been examined wearing personal protective equipment.     I discussed the patient's findings and my recommendations with patient, family, nursing staff, primary care team and consulting provider.      Signature:Electronically signed by JOSE Saenz, 02/19/23, 3:45 PM EST.      I spent 60 minutes caring for Marquise on this date of service. This time includes time spent by me in the following activities: reviewing tests, obtaining and/or reviewing a separately obtained history, performing a medically appropriate examination and/or evaluation, counseling and  educating the patient/family/caregiver, ordering medications, tests, or procedures, referring and communicating with other health care professionals, documenting information in the medical record, independently interpreting results and communicating that information with the patient/family/caregiver and care coordination.

## 2023-02-20 ENCOUNTER — READMISSION MANAGEMENT (OUTPATIENT)
Dept: CALL CENTER | Facility: HOSPITAL | Age: 43
End: 2023-02-20
Payer: COMMERCIAL

## 2023-02-20 VITALS
TEMPERATURE: 97.9 F | DIASTOLIC BLOOD PRESSURE: 83 MMHG | OXYGEN SATURATION: 97 % | SYSTOLIC BLOOD PRESSURE: 117 MMHG | WEIGHT: 315 LBS | RESPIRATION RATE: 21 BRPM | HEART RATE: 75 BPM | BODY MASS INDEX: 42.66 KG/M2 | HEIGHT: 72 IN

## 2023-02-20 PROBLEM — I50.22 CHRONIC HFREF (HEART FAILURE WITH REDUCED EJECTION FRACTION): Status: ACTIVE | Noted: 2023-02-20

## 2023-02-20 PROBLEM — D89.831 CYTOKINE RELEASE SYNDROME, GRADE 1: Status: ACTIVE | Noted: 2023-02-20

## 2023-02-20 LAB
ANION GAP SERPL CALCULATED.3IONS-SCNC: 10 MMOL/L (ref 5–15)
BASOPHILS # BLD AUTO: 0.1 10*3/MM3 (ref 0–0.2)
BASOPHILS NFR BLD AUTO: 1 % (ref 0–1.5)
BUN SERPL-MCNC: 17 MG/DL (ref 6–20)
BUN/CREAT SERPL: 20.5 (ref 7–25)
CALCIUM SPEC-SCNC: 8.6 MG/DL (ref 8.6–10.5)
CHLORIDE SERPL-SCNC: 108 MMOL/L (ref 98–107)
CO2 SERPL-SCNC: 21 MMOL/L (ref 22–29)
CREAT SERPL-MCNC: 0.83 MG/DL (ref 0.76–1.27)
DEPRECATED RDW RBC AUTO: 55.1 FL (ref 37–54)
EGFRCR SERPLBLD CKD-EPI 2021: 113.5 ML/MIN/1.73
EOSINOPHIL # BLD AUTO: 0 10*3/MM3 (ref 0–0.4)
EOSINOPHIL NFR BLD AUTO: 0.8 % (ref 0.3–6.2)
ERYTHROCYTE [DISTWIDTH] IN BLOOD BY AUTOMATED COUNT: 14.8 % (ref 12.3–15.4)
GLUCOSE SERPL-MCNC: 104 MG/DL (ref 65–99)
HCT VFR BLD AUTO: 45.4 % (ref 37.5–51)
HGB BLD-MCNC: 15.6 G/DL (ref 13–17.7)
LYMPHOCYTES # BLD AUTO: 1.5 10*3/MM3 (ref 0.7–3.1)
LYMPHOCYTES NFR BLD AUTO: 24.8 % (ref 19.6–45.3)
MCH RBC QN AUTO: 34.7 PG (ref 26.6–33)
MCHC RBC AUTO-ENTMCNC: 34.3 G/DL (ref 31.5–35.7)
MCV RBC AUTO: 101 FL (ref 79–97)
MONOCYTES # BLD AUTO: 0.9 10*3/MM3 (ref 0.1–0.9)
MONOCYTES NFR BLD AUTO: 14.2 % (ref 5–12)
NEUTROPHILS NFR BLD AUTO: 3.6 10*3/MM3 (ref 1.7–7)
NEUTROPHILS NFR BLD AUTO: 59.2 % (ref 42.7–76)
NRBC BLD AUTO-RTO: 0 /100 WBC (ref 0–0.2)
PLATELET # BLD AUTO: 149 10*3/MM3 (ref 140–450)
PMV BLD AUTO: 8.8 FL (ref 6–12)
POTASSIUM SERPL-SCNC: 4.1 MMOL/L (ref 3.5–5.2)
RBC # BLD AUTO: 4.49 10*6/MM3 (ref 4.14–5.8)
SODIUM SERPL-SCNC: 139 MMOL/L (ref 136–145)
WBC NRBC COR # BLD: 6.2 10*3/MM3 (ref 3.4–10.8)

## 2023-02-20 PROCEDURE — G0378 HOSPITAL OBSERVATION PER HR: HCPCS

## 2023-02-20 PROCEDURE — 99214 OFFICE O/P EST MOD 30 MIN: CPT | Performed by: NURSE PRACTITIONER

## 2023-02-20 PROCEDURE — 80048 BASIC METABOLIC PNL TOTAL CA: CPT | Performed by: NURSE PRACTITIONER

## 2023-02-20 PROCEDURE — 85025 COMPLETE CBC W/AUTO DIFF WBC: CPT | Performed by: NURSE PRACTITIONER

## 2023-02-20 PROCEDURE — 25010000002 FUROSEMIDE PER 20 MG: Performed by: NURSE PRACTITIONER

## 2023-02-20 PROCEDURE — 96376 TX/PRO/DX INJ SAME DRUG ADON: CPT

## 2023-02-20 RX ORDER — CARVEDILOL 12.5 MG/1
12.5 TABLET ORAL DAILY
Qty: 90 TABLET | Refills: 0 | Status: SHIPPED | OUTPATIENT
Start: 2023-02-21 | End: 2023-02-20 | Stop reason: SDUPTHER

## 2023-02-20 RX ORDER — SPIRONOLACTONE 50 MG/1
50 TABLET, FILM COATED ORAL DAILY
Qty: 90 TABLET | Refills: 0 | Status: SHIPPED | OUTPATIENT
Start: 2023-02-21 | End: 2023-04-03 | Stop reason: SDUPTHER

## 2023-02-20 RX ORDER — CARVEDILOL 12.5 MG/1
12.5 TABLET ORAL DAILY
Qty: 90 TABLET | Refills: 0 | Status: SHIPPED | OUTPATIENT
Start: 2023-02-21 | End: 2023-04-03 | Stop reason: SDUPTHER

## 2023-02-20 RX ADMIN — FUROSEMIDE 40 MG: 10 INJECTION, SOLUTION INTRAMUSCULAR; INTRAVENOUS at 08:52

## 2023-02-20 RX ADMIN — SPIRONOLACTONE 50 MG: 25 TABLET ORAL at 08:51

## 2023-02-20 RX ADMIN — SACUBITRIL AND VALSARTAN 1 TABLET: 24; 26 TABLET, FILM COATED ORAL at 08:51

## 2023-02-20 RX ADMIN — Medication 10 ML: at 08:54

## 2023-02-20 RX ADMIN — CARVEDILOL 12.5 MG: 6.25 TABLET, FILM COATED ORAL at 08:51

## 2023-02-20 RX ADMIN — Medication 10 ML: at 08:52

## 2023-02-20 NOTE — PROGRESS NOTES
"  Cardiology Progress Note    Patient Identification:  Name: Marquise Short  Age: 42 y.o.  Sex: male  :  1980  MRN: [id number]                 Follow Up / Chief Complaint:   Chief Complaint   Patient presents with   • Shortness of Breath     Pt reports SOA, dizziness and states \"I can't stay awake\" for the past 2 days.  Pt reports hx of A fib.        Interval History: Patient presented to the ED on 2023 with not being able to stay awake.  He was diagnosed with COVID.     NP NOTE: Patient seen and examined; remains in afib, no distress noted.  He reports continued shortness of breath, cough with yellow sputum and fatigue.   Will continue patient on home meds- coreg, entresto, spironolactone and ADD jardiance.   Patient needs to keep follow up appointments.     Electronically signed by JOSE Scott, 23, 11:59 AM EST.         Subjective: Patient seen and examined; chart and labs reviewed; discussed with bedside nurse       Objective: HS troponin is very minimally elevated 25->31->30  Pro bnp 5000  2023 ALT 94 AST 80   2023: glucose 104    Mr.Scott DALJIT Short has PMH of     # A.Fib, s/p AZALIA guided cardioversion 2018, 2019  # cardiomyopathy possibly tachycardia induced cardiomyopathy EF of 30% by AZALIA 2018  # abnormal Lexiscan with fixed inferior defect 2018, LVEF of 20%, trans esophageal echo  3/21/2019 EF of 40%  #  hypertension,   # morbid obesity  #  cigarette smoker  # EtOH use        Presented to the ED on 2023 with shortness of breath and inability to stay awake.  He denied any chest pain or palpitations.   Patient has ERIBERTO and does not wear CPAP he also continues to drink alcohol daily to help him sleep.             ASSESSMENT:  COVID-19  Elevate HS troponin   Paroxysmal A. fib on long-term anticoagulation   Hypertension  Chronic systolic and diastolic congestive heart failure due to atrial fibrillation and tachycardia induced cardiomyopathy, HCVD  chest pain, "  equivocal stress test with fixed inferior defect 06/29/2018  snores, obesity  ERIBERTO not on CPAP therapy   Hypertension/ HCVD  Elevated liver enzymes ---possibly due to COVID or EtOH use or hepatic congestion        PLAN:  Patient presented with shortness of breath and inability to stay awake   Patient was diagnosed with COVID   HS troponin is very minimally elevated 25->31->30  EKG Afib   Pro BNP 5020  Minimal vascular congestion noted on CXR  ON IV lasix --- switch to PO     Patient was previously on Tikosyn however could not keep follow ups to monitor EKGs     Continue rate control with carvedilol 12.5mg BID   [id number] SCORE   [id number] Score: 2 (2/19/2023  9:00 AM)  Continue Xarelto     Continue carvedilol, Entresto and spironolactone for cardiomyopathy   EP saw patient over the weekend and suggested afib ablation however unlikely to restore sinus rhythm if patient continues to drink alcohol and does not wear CPAP.       Echocardiogram 2/19/2023  Left ventricular ejection fraction appears to be 26 - 30%.  •  The left ventricular cavity is dilated.  •  Left ventricular diastolic dysfunction is noted.  •  The right atrial cavity is mildly  dilated.    Patient would need ICD since his EF has not improved on medical therapy         Copied text in this portion of the note has been reviewed and is accurate as of 2/20/2023    Past Medical History:  Past Medical History:   Diagnosis Date   • A-fib (HCC)    • Cardiomyopathy (HCC)    • Hypertension      Past Surgical History:  Past Surgical History:   Procedure Laterality Date   • CARDIOVERSION          Social History:   Social History     Tobacco Use   • Smoking status: Every Day     Years: 20.00     Types: Cigarettes   • Smokeless tobacco: Current     Types: Chew   Substance Use Topics   • Alcohol use: Never      Family History:  Family History   Problem Relation Age of Onset   • Heart attack Paternal Grandfather           Allergies:  No Known Allergies  Scheduled  "Meds:  carvedilol, 12.5 mg, Daily  furosemide, 40 mg, Daily  rivaroxaban, 20 mg, Daily With Dinner  sacubitril-valsartan, 1 tablet, Q12H  sodium chloride, 10 mL, Q12H  spironolactone, 50 mg, Daily          Review of Systems:   Review of Systems   Constitutional: Positive for malaise/fatigue.   Cardiovascular: Negative for chest pain, leg swelling and orthopnea.   Respiratory: Positive for cough, shortness of breath and sputum production.    Gastrointestinal: Negative for abdominal pain, nausea and vomiting.   All other systems reviewed and are negative.    Review of Systems   Constitution: Negative for chills and fever.   Cardiovascular: Negative for chest pain and palpitations.   Respiratory: Negative for cough and hemoptysis.    Gastrointestinal: Negative for nausea.        Constitutional:  Temp:  [96.2 °F (35.7 °C)-98.7 °F (37.1 °C)] 96.2 °F (35.7 °C)  Heart Rate:  [] 89  Resp:  [16-20] 17  BP: (107-159)/() 120/75    Physical Exam   /75 (BP Location: Right arm, Patient Position: Lying)   Pulse 89   Temp 96.2 °F (35.7 °C) (Oral)   Resp 17   Ht 182.9 cm (72\")   Wt (!) 167 kg (367 lb 12.8 oz)   SpO2 97%   BMI 49.88 kg/m²   General:  Appears in no acute distress  Eyes: Sclera is anicteric,  conjunctiva is clear   HEENT:  No JVD. Thyroid not visibly enlarged. No mucosal pallor or cyanosis  Respiratory: Respirations regular and unlabored at rest.  Bilaterally good breath sounds, with good air entry in all fields. No crackles, rubs or wheezes auscultated  Cardiovascular: S1,S2 irregular rate and rhythm. + murmur, rub or gallop auscultated.  . No pretibial pitting edema  Gastrointestinal: Abdomen nondistended, soft  Musculoskeletal:  No abnormal movements  Extremities: No digital clubbing or cyanosis  Skin: Color pink. Skin warm and dry to touch. No rashes  No xanthoma  Neuro: Alert and awake, no lateralizing deficits appreciated    INTAKE AND OUTPUT:    Intake/Output Summary (Last 24 hours) at " 2/20/2023 0935  Last data filed at 2/20/2023 0925  Gross per 24 hour   Intake 1384 ml   Output 710 ml   Net 674 ml       Cardiographics  Telemetry: afib     ECG:   ECG 12 Lead Rhythm Change   Preliminary Result   HEART RATE= 97  bpm   RR Interval= 617  ms   WI Interval=   ms   P Horizontal Axis=   deg   P Front Axis=   deg   QRSD Interval= 93  ms   QT Interval= 433  ms   QRS Axis= 8  deg   T Wave Axis= 68  deg   - ABNORMAL ECG -   Atrial fibrillation   Prolonged QT interval   Electronically Signed By:    Date and Time of Study: 2023-02-19 08:52:34      ECG 12 Lead Dyspnea   Final Result   HEART RATE= 118  bpm   RR Interval= 509  ms   WI Interval=   ms   P Horizontal Axis=   deg   P Front Axis=   deg   QRSD Interval= 103  ms   QT Interval= 329  ms   QRS Axis= -5  deg   T Wave Axis= 75  deg   - ABNORMAL ECG -   Atrial fibrillation   Probable LVH with secondary repol abnrm   When compared with ECG of 21-Mar-2019 9:31:43,   Significant change in rhythm: previously sinus   Significant repolarization change   Significant axis, voltage or hypertrophy change   Electronically Signed By: Acosta Valle ([facility]) 19-Feb-2023 08:54:05   Date and Time of Study: 2023-02-18 15:06:39        I have personally reviewed EKG    Echocardiogram: Results for orders placed during the hospital encounter of 02/18/23    Adult Transthoracic Echo Limited W/ Cont if Necessary Per Protocol    Interpretation Summary  •  Left ventricular ejection fraction appears to be 26 - 30%.  •  The left ventricular cavity is dilated.  •  Left ventricular diastolic dysfunction is noted.  •  The right atrial cavity is mildly  dilated.      Lab Review   I have reviewed the labs  Results from last 7 days   Lab Units 02/18/23  2233 02/18/23  1745 02/18/23  1540   HSTROP T ng/L 30* 31* 26*         Results from last 7 days   Lab Units 02/20/23  0515   SODIUM mmol/L 139   POTASSIUM mmol/L 4.1   BUN mg/dL 17   CREATININE mg/dL 0.83   CALCIUM mg/dL 8.6         Results from  "last 7 days   Lab Units 02/20/23  0515 02/19/23  0849 02/18/23  1540   WBC 10*3/mm3 6.20 5.90 6.40   HEMOGLOBIN g/dL 15.6 15.8 14.3   HEMATOCRIT % 45.4 48.5 42.1   PLATELETS 10*3/mm3 149 162 163     Results from last 7 days   Lab Units 02/18/23  1540   INR  1.52*   APTT seconds 36.1*       RADIOLOGY:  Imaging Results (Last 24 Hours)     ** No results found for the last 24 hours. **                )2/20/2023  MD DHARA Tian/Transcription:   \"Dictated utilizing Dragon dictation\".   "

## 2023-02-20 NOTE — DISCHARGE SUMMARY
[facility]    Provider, No Known    CHIEF COMPLAINT:     Chest Pain     HISTORY OF PRESENT ILLNESS:    [facility]    ED 2/18/23: Patient is a 42-year-old morbidly obese gentleman who comes in with fatigue and states that he has been extremely drowsy for the last 2 days.  He states that he also is having some shortness of breath.  He states that he did not have a proper follow-up with Dr. Kohler and ran out of his Xarelto lisinopril and carvedilol and made an appointment with him and they refilled the Xarelto but did not refill his other medication-he states he made an appointment with Dr. Kohler which is for April but he has had increased shortness of breath and increased fatigue which brought him to the emergency room today.  He denies any chest pain.  He denies any cough congestion fever chills     OBS 2/19/23: Patient is a 42-year-old male presented to the hospital with fatigue and dyspnea that is worsened over the past 2 days.  Patient states that he cannot stay awake.  Patient states he is ran out of his cardiac medication and has not had an appointment with cardiologist, Dr. Kohler, in some time.  Patient reports some chills and body aches and fatigue but feels like the symptoms are related to his heart failure.  Patient states that he has not been compliant with medication.    Past Medical History:   Diagnosis Date   • A-fib (HCC)    • Cardiomyopathy (HCC)    • Hypertension      Past Surgical History:   Procedure Laterality Date   • CARDIOVERSION       Family History   Problem Relation Age of Onset   • Heart attack Paternal Grandfather      Social History     Tobacco Use   • Smoking status: Every Day     Years: 20.00     Types: Cigarettes   • Smokeless tobacco: Current     Types: Chew   Vaping Use   • Vaping Use: Never used   Substance Use Topics   • Alcohol use: Never   • Drug use: Never     Medications Prior to Admission   Medication Sig Dispense Refill Last Dose   • carvedilol  (COREG) 12.5 MG tablet Take 1 tablet by mouth Daily. 30 tablet 1    • rivaroxaban (Xarelto) 20 MG tablet Take 1 tablet by mouth Daily. 30 tablet 1    • spironolactone (ALDACTONE) 25 MG tablet Take 2 tablets by mouth Daily. 60 tablet 1      Allergies:  Patient has no known allergies.    Immunization History   Administered Date(s) Administered   • MMR 02/18/1994           REVIEW OF SYSTEMS:    Review of Systems   Constitutional: Positive for malaise/fatigue.   HENT: Positive for congestion.    Eyes: Negative.    Cardiovascular: Positive for dyspnea on exertion and leg swelling.   Respiratory: Positive for cough.    Endocrine: Negative.    Hematologic/Lymphatic: Negative.    Musculoskeletal: Positive for myalgias.   Gastrointestinal: Positive for nausea.   Genitourinary: Negative.    Neurological: Negative.    Psychiatric/Behavioral: Negative.    Allergic/Immunologic: Negative.        Vital Signs  Temp:  [96.2 °F (35.7 °C)-98.6 °F (37 °C)] 97.9 °F (36.6 °C)  Heart Rate:  [] 75  Resp:  [16-21] 21  BP: (107-143)/() 117/83          Physical Exam:  Physical Exam  Constitutional:       Appearance: Normal appearance.   HENT:      Head: Normocephalic and atraumatic.      Right Ear: External ear normal.      Left Ear: External ear normal.      Nose: Congestion present.      Mouth/Throat:      Mouth: Mucous membranes are moist.      Pharynx: Oropharynx is clear.   Eyes:      Extraocular Movements: Extraocular movements intact.      Conjunctiva/sclera: Conjunctivae normal.      Pupils: Pupils are equal, round, and reactive to light.   Cardiovascular:      Rate and Rhythm: Normal rate. Rhythm irregular.      Pulses: Normal pulses.      Heart sounds: Normal heart sounds.   Pulmonary:      Effort: Pulmonary effort is normal.      Breath sounds: Rhonchi present.   Abdominal:      General: Bowel sounds are normal. There is distension.      Palpations: Abdomen is soft.   Musculoskeletal:      Cervical back: Normal range of  motion.      Right lower leg: Edema present.      Left lower leg: Edema present.   Skin:     General: Skin is warm.      Capillary Refill: Capillary refill takes less than 2 seconds.   Neurological:      General: No focal deficit present.      Mental Status: He is alert and oriented to person, place, and time.   Psychiatric:         Mood and Affect: Mood normal.         Behavior: Behavior normal.         Thought Content: Thought content normal.         Judgment: Judgment normal.         Emotional Behavior:    WNL   Debilities:   none  Results Review:    I reviewed the patient's new clinical results.  Lab Results (most recent)     Procedure Component Value Units Date/Time    Basic Metabolic Panel [[id number]]  (Abnormal) Collected: 02/20/23 0515    Specimen: Blood Updated: 02/20/23 0552     Glucose 104 mg/dL      BUN 17 mg/dL      Creatinine 0.83 mg/dL      Sodium 139 mmol/L      Potassium 4.1 mmol/L      Chloride 108 mmol/L      CO2 21.0 mmol/L      Calcium 8.6 mg/dL      BUN/Creatinine Ratio 20.5     Anion Gap 10.0 mmol/L      eGFR 113.5 mL/min/1.73     Narrative:      GFR Normal >60  Chronic Kidney Disease <60  Kidney Failure <15      CBC & Differential [[id number]]  (Abnormal) Collected: 02/20/23 0515    Specimen: Blood Updated: 02/20/23 0532    Narrative:      The following orders were created for panel order CBC & Differential.  Procedure                               Abnormality         Status                     ---------                               -----------         ------                     CBC Auto Differential[[id number]]        Abnormal            Final result                 Please view results for these tests on the individual orders.    CBC Auto Differential [[id number]]  (Abnormal) Collected: 02/20/23 0515    Specimen: Blood Updated: 02/20/23 0532     WBC 6.20 10*3/mm3      RBC 4.49 10*6/mm3      Hemoglobin 15.6 g/dL      Hematocrit 45.4 %      .0 fL      MCH 34.7 pg      MCHC 34.3 g/dL       RDW 14.8 %      RDW-SD 55.1 fl      MPV 8.8 fL      Platelets 149 10*3/mm3      Neutrophil % 59.2 %      Lymphocyte % 24.8 %      Monocyte % 14.2 %      Eosinophil % 0.8 %      Basophil % 1.0 %      Neutrophils, Absolute 3.60 10*3/mm3      Lymphocytes, Absolute 1.50 10*3/mm3      Monocytes, Absolute 0.90 10*3/mm3      Eosinophils, Absolute 0.00 10*3/mm3      Basophils, Absolute 0.10 10*3/mm3      nRBC 0.0 /100 WBC     Blood Culture - Blood, Arm, Left [[id number]]  (Normal) Collected: 02/18/23 2233    Specimen: Blood from Arm, Left Updated: 02/19/23 2300     Blood Culture No growth at 24 hours    Blood Culture - Blood, Arm, Right [[id number]]  (Normal) Collected: 02/18/23 1842    Specimen: Blood from Arm, Right Updated: 02/19/23 1916     Blood Culture No growth at 24 hours    Comprehensive Metabolic Panel [[id number]]  (Abnormal) Collected: 02/19/23 0849    Specimen: Blood from Arm, Left Updated: 02/19/23 0943     Glucose 93 mg/dL      BUN 14 mg/dL      Creatinine 0.97 mg/dL      Sodium 137 mmol/L      Potassium 4.1 mmol/L      Chloride 104 mmol/L      CO2 22.0 mmol/L      Calcium 8.7 mg/dL      Total Protein 7.7 g/dL      Albumin 3.8 g/dL      ALT (SGPT) 94 U/L      AST (SGOT) 80 U/L      Alkaline Phosphatase 77 U/L      Total Bilirubin 0.6 mg/dL      Globulin 3.9 gm/dL      A/G Ratio 1.0 g/dL      BUN/Creatinine Ratio 14.4     Anion Gap 11.0 mmol/L      eGFR 101.2 mL/min/1.73     Narrative:      GFR Normal >60  Chronic Kidney Disease <60  Kidney Failure <15      CBC & Differential [[id number]]  (Abnormal) Collected: 02/19/23 0849    Specimen: Blood from Arm, Left Updated: 02/19/23 0912    Narrative:      The following orders were created for panel order CBC & Differential.  Procedure                               Abnormality         Status                     ---------                               -----------         ------                     CBC Auto Differential[[id number]]        Abnormal            Final result                  Please view results for these tests on the individual orders.    CBC Auto Differential [[id number]]  (Abnormal) Collected: 02/19/23 0849    Specimen: Blood from Arm, Left Updated: 02/19/23 0912     WBC 5.90 10*3/mm3      RBC 4.73 10*6/mm3      Hemoglobin 15.8 g/dL      Hematocrit 48.5 %      .5 fL      MCH 33.4 pg      MCHC 32.6 g/dL      RDW 15.1 %      RDW-SD 54.3 fl      MPV 8.5 fL      Platelets 162 10*3/mm3      Neutrophil % 63.0 %      Lymphocyte % 19.2 %      Monocyte % 17.1 %      Eosinophil % 0.1 %      Basophil % 0.6 %      Neutrophils, Absolute 3.70 10*3/mm3      Lymphocytes, Absolute 1.10 10*3/mm3      Monocytes, Absolute 1.00 10*3/mm3      Eosinophils, Absolute 0.00 10*3/mm3      Basophils, Absolute 0.00 10*3/mm3      nRBC 0.1 /100 WBC     High Sensitivity Troponin T [[id number]]  (Abnormal) Collected: 02/18/23 2233    Specimen: Blood Updated: 02/18/23 2314     HS Troponin T 30 ng/L     Narrative:      High Sensitive Troponin T Reference Range:  <10.0 ng/L- Negative Female for AMI  <15.0 ng/L- Negative Male for AMI  >=10 - Abnormal Female indicating possible myocardial injury.  >=15 - Abnormal Male indicating possible myocardial injury.   Clinicians would have to utilize clinical acumen, EKG, Troponin, and serial changes to determine if it is an Acute Myocardial Infarction or myocardial injury due to an underlying chronic condition.         Lactic Acid, Plasma [[id number]]  (Normal) Collected: 02/18/23 1842    Specimen: Blood from Arm, Left Updated: 02/18/23 1926     Lactate 1.2 mmol/L     Osmolality, Serum [[id number]]  (Normal) Collected: 02/18/23 1842    Specimen: Blood from Arm, Left Updated: 02/18/23 1922     Osmolality 276 mOsm/kg     Respiratory Panel PCR w/COVID-19(SARS-CoV-2) J LUIS/AUDREY/ADRIANA/PAD/COR/MAD/SHREE In-House, NP Swab in [facility], 3-4 HR TAT - Swab, Nasopharynx [[id number]]  (Abnormal) Collected: 02/18/23 1745    Specimen: Swab from Nasopharynx Updated: 02/18/23 1908      ADENOVIRUS, PCR Not Detected     Coronavirus 229E Not Detected     Coronavirus HKU1 Not Detected     Coronavirus NL63 Not Detected     Coronavirus OC43 Not Detected     COVID19 Detected     Human Metapneumovirus Not Detected     Human Rhinovirus/Enterovirus Not Detected     Influenza A PCR Not Detected     Influenza B PCR Not Detected     Parainfluenza Virus 1 Not Detected     Parainfluenza Virus 2 Not Detected     Parainfluenza Virus 3 Not Detected     Parainfluenza Virus 4 Not Detected     RSV, PCR Not Detected     Bordetella pertussis pcr Not Detected     Bordetella parapertussis PCR Not Detected     Chlamydophila pneumoniae PCR Not Detected     Mycoplasma pneumo by PCR Not Detected    Narrative:      In the setting of a positive respiratory panel with a viral infection PLUS a negative procalcitonin without other underlying concern for bacterial infection, consider observing off antibiotics or discontinuation of antibiotics and continue supportive care. If the respiratory panel is positive for atypical bacterial infection (Bordetella pertussis, Chlamydophila pneumoniae, or Mycoplasma pneumoniae), consider antibiotic de-escalation to target atypical bacterial infection.    High Sensitivity Troponin T 2Hr [[id number]]  (Abnormal) Collected: 02/18/23 1745    Specimen: Blood from Arm, Left Updated: 02/18/23 1827     HS Troponin T 31 ng/L      Troponin T Delta 5 ng/L     Narrative:      High Sensitive Troponin T Reference Range:  <10.0 ng/L- Negative Female for AMI  <15.0 ng/L- Negative Male for AMI  >=10 - Abnormal Female indicating possible myocardial injury.  >=15 - Abnormal Male indicating possible myocardial injury.   Clinicians would have to utilize clinical acumen, EKG, Troponin, and serial changes to determine if it is an Acute Myocardial Infarction or myocardial injury due to an underlying chronic condition.         Sodium, Urine, Random - Urine, Clean Catch [[id number]] Collected: 02/18/23 1637    Specimen:  Urine, Clean Catch Updated: 02/18/23 1826     Sodium, Urine 168 mmol/L     Narrative:      Reference intervals for random urine have not been established.  Clinical usage is dependent upon physician's interpretation in combination with other laboratory tests.       Urinalysis, Microscopic Only - Urine, Clean Catch [[id number]]  (Abnormal) Collected: 02/18/23 1637    Specimen: Urine, Clean Catch Updated: 02/18/23 1816     RBC, UA 13-20 /HPF      WBC, UA 0-2 /HPF      Comment: Urine culture not indicated.        Bacteria, UA None Seen /HPF      Squamous Epithelial Cells, UA 0-2 /HPF      Hyaline Casts, UA None Seen /LPF      Methodology Automated Microscopy    Urinalysis With Culture If Indicated - Urine, Clean Catch [[id number]]  (Abnormal) Collected: 02/18/23 1637    Specimen: Urine, Clean Catch Updated: 02/18/23 1813     Color, UA Yellow     Appearance, UA Clear     pH, UA 7.5     Specific Gravity, UA 1.015     Glucose, UA Negative     Ketones, UA Negative     Bilirubin, UA Negative     Blood, UA Small (1+)     Protein, UA 30 mg/dL (1+)     Leuk Esterase, UA Negative     Nitrite, UA Negative     Urobilinogen, UA 1.0 E.U./dL    Narrative:      In absence of clinical symptoms, the presence of pyuria, bacteria, and/or nitrites on the urinalysis result does not correlate with infection.    Urine Drug Screen - Urine, Clean Catch [[id number]]  (Normal) Collected: 02/18/23 1637    Specimen: Urine, Clean Catch Updated: 02/18/23 1703     Amphet/Methamphet, Screen Negative     Barbiturates Screen, Urine Negative     Benzodiazepine Screen, Urine Negative     Cocaine Screen, Urine Negative     Opiate Screen Negative     THC, Screen, Urine Negative     Methadone Screen, Urine Negative     Oxycodone Screen, Urine Negative    Narrative:      Negative Thresholds Per Drugs Screened:    Amphetamines                 500 ng/ml  Barbiturates                 200 ng/ml  Benzodiazepines              100 ng/ml  Cocaine                       300 ng/ml  Methadone                    300 ng/ml  Opiates                      300 ng/ml  Oxycodone                    100 ng/ml  THC                           50 ng/ml    The Normal Value for all drugs tested is negative. This report includes final unconfirmed screening results to be used for medical treatment purposes only. Unconfirmed results must not be used for non-medical purposes such as employment or legal testing. Clinical consideration should be applied to any drug of abuse test, particularly when unconfirmed results are used.          All urine drugs of abuse requests without chain of custody are for medical screening purposes only.  False positives are possible.      aPTT [[id number]]  (Abnormal) Collected: 02/18/23 1540    Specimen: Blood Updated: 02/18/23 1647     PTT 36.1 seconds     BNP [[id number]]  (Abnormal) Collected: 02/18/23 1540    Specimen: Blood Updated: 02/18/23 1611     proBNP 5,020.0 pg/mL     Narrative:      Among patients with dyspnea, NT-proBNP is highly sensitive for the detection of acute congestive heart failure. In addition NT-proBNP of <300 pg/ml effectively rules out acute congestive heart failure with 99% negative predictive value.    Results may be falsely decreased if patient taking Biotin.      High Sensitivity Troponin T [[id number]]  (Abnormal) Collected: 02/18/23 1540    Specimen: Blood Updated: 02/18/23 1611     HS Troponin T 26 ng/L     Narrative:      High Sensitive Troponin T Reference Range:  <10.0 ng/L- Negative Female for AMI  <15.0 ng/L- Negative Male for AMI  >=10 - Abnormal Female indicating possible myocardial injury.  >=15 - Abnormal Male indicating possible myocardial injury.   Clinicians would have to utilize clinical acumen, EKG, Troponin, and serial changes to determine if it is an Acute Myocardial Infarction or myocardial injury due to an underlying chronic condition.         Comprehensive Metabolic Panel [[id number]]  (Abnormal) Collected: 02/18/23 1540     Specimen: Blood Updated: 02/18/23 1606     Glucose 100 mg/dL      BUN 9 mg/dL      Creatinine 1.02 mg/dL      Sodium 129 mmol/L      Potassium 4.1 mmol/L      Chloride 98 mmol/L      CO2 22.0 mmol/L      Calcium 8.6 mg/dL      Total Protein 7.0 g/dL      Albumin 3.7 g/dL      ALT (SGPT) 75 U/L      AST (SGOT) 60 U/L      Alkaline Phosphatase 78 U/L      Total Bilirubin 0.7 mg/dL      Globulin 3.3 gm/dL      A/G Ratio 1.1 g/dL      BUN/Creatinine Ratio 8.8     Anion Gap 9.0 mmol/L      eGFR 95.3 mL/min/1.73     Narrative:      GFR Normal >60  Chronic Kidney Disease <60  Kidney Failure <15      Protime-INR [[id number]]  (Abnormal) Collected: 02/18/23 1540    Specimen: Blood Updated: 02/18/23 1557     Protime 15.3 Seconds      INR 1.52          Imaging Results (Most Recent)     Procedure Component Value Units Date/Time    XR Chest 1 View [[id number]] Collected: 02/18/23 1612     Updated: 02/18/23 1622    Narrative:      XR CHEST 1 VW    Date of Exam: 2/18/2023 4:08 PM EST    Indication: CHF/COPD Protocol  CHF/COPD Protocol.    Comparison: None    Findings:  The heart is not definitely enlarged. The lungs seem relatively clear. There are no pleural effusions. There may be slight prominence of perivascular markings which could be reflective of some vascular congestion in the proper clinical setting.      Impression:      Impression:  1.Slight prominence of pulmonary vascular markings. In the proper clinical setting some vascular congestion could not be excluded.    Electronically Signed: Leonardo Dobson    2/18/2023 4:20 PM EST    Workstation ID: [id number]        reviewed    ECG/EMG Results (most recent)     Procedure Component Value Units Date/Time    ECG 12 Lead Dyspnea [[id number]] Collected: 02/18/23 1506     Updated: 02/19/23 0854     QT Interval 329 ms     Narrative:      HEART RATE= 118  bpm  RR Interval= 509  ms  UT Interval=   ms  P Horizontal Axis=   deg  P Front Axis=   deg  QRSD Interval= 103  ms  QT Interval= 329   ms  QRS Axis= -5  deg  T Wave Axis= 75  deg  - ABNORMAL ECG -  Atrial fibrillation  Probable LVH with secondary repol abnrm  When compared with ECG of 21-Mar-2019 9:31:43,  Significant change in rhythm: previously sinus  Significant repolarization change  Significant axis, voltage or hypertrophy change  Electronically Signed By: Acosta Valle ([facility]) 19-Feb-2023 08:54:05  Date and Time of Study: 2023-02-18 15:06:39    ECG 12 Lead Rhythm Change [[id number]] Collected: 02/19/23 0852     Updated: 02/19/23 0854     QT Interval 433 ms     Narrative:      HEART RATE= 97  bpm  RR Interval= 617  ms  [place] Interval=   ms  P Horizontal Axis=   deg  P Front Axis=   deg  QRSD Interval= 93  ms  QT Interval= 433  ms  QRS Axis= 8  deg  T Wave Axis= 68  deg  - ABNORMAL ECG -  Atrial fibrillation  Prolonged QT interval  Electronically Signed By:   Date and Time of Study: 2023-02-19 08:52:34    Adult Transthoracic Echo Limited W/ Cont if Necessary Per Protocol [[id number]] Resulted: 02/19/23 1213     Updated: 02/19/23 1213     Target HR (85%) 153 bpm      Max. Pred. HR (100%) 180 bpm      LVIDd 5.4 cm      LVIDs 4.4 cm      IVSd 1.80 cm      LVPWd 2.10 cm      FS 18.5 %      IVS/LVPW 0.86 cm      ESV(cubed) 85.2 ml      LV Sys Vol (BSA corrected) 25.3 cm2      EDV(cubed) 157.5 ml      LV Escobar Vol (BSA corrected) 44.2 cm2      LV mass(C)d 538.8 grams      EDV(MOD-sp4) 122.0 ml      ESV(MOD-sp4) 69.9 ml      SV(MOD-sp4) 52.1 ml      SI(MOD-sp4) 18.9 ml/m2      EF(MOD-sp4) 42.7 %      LA dimension (2D)  4.2 cm      Ao root diam 3.5 cm      ACS 2.6 cm      EF(MOD-bp) 42.0 %     Narrative:      •  Left ventricular ejection fraction appears to be 26 - 30%.  •  The left ventricular cavity is dilated.  •  Left ventricular diastolic dysfunction is noted.  •  The right atrial cavity is mildly  dilated.          reviewed        Results for orders placed during the hospital encounter of 02/18/23    Adult Transthoracic Echo Limited W/ Cont if Necessary  Per Protocol    Interpretation Summary  •  Left ventricular ejection fraction appears to be 26 - 30%.  •  The left ventricular cavity is dilated.  •  Left ventricular diastolic dysfunction is noted.  •  The right atrial cavity is mildly  dilated.      Microbiology Results (last 10 days)     Procedure Component Value - Date/Time    Blood Culture - Blood, Arm, Left [[id number]]  (Normal) Collected: 02/18/23 2233    Lab Status: Preliminary result Specimen: Blood from Arm, Left Updated: 02/19/23 2300     Blood Culture No growth at 24 hours    Blood Culture - Blood, Arm, Right [[id number]]  (Normal) Collected: 02/18/23 1842    Lab Status: Preliminary result Specimen: Blood from Arm, Right Updated: 02/19/23 1916     Blood Culture No growth at 24 hours    Respiratory Panel PCR w/COVID-19(SARS-CoV-2) J LUIS/AUDREY/ADRIANA/PAD/COR/MAD/SHREE In-House, NP Swab in UTM/VTM, 3-4 HR TAT - Swab, Nasopharynx [[id number]]  (Abnormal) Collected: 02/18/23 1745    Lab Status: Final result Specimen: Swab from Nasopharynx Updated: 02/18/23 1904     ADENOVIRUS, PCR Not Detected     Coronavirus 229E Not Detected     Coronavirus HKU1 Not Detected     Coronavirus NL63 Not Detected     Coronavirus OC43 Not Detected     COVID19 Detected     Human Metapneumovirus Not Detected     Human Rhinovirus/Enterovirus Not Detected     Influenza A PCR Not Detected     Influenza B PCR Not Detected     Parainfluenza Virus 1 Not Detected     Parainfluenza Virus 2 Not Detected     Parainfluenza Virus 3 Not Detected     Parainfluenza Virus 4 Not Detected     RSV, PCR Not Detected     Bordetella pertussis pcr Not Detected     Bordetella parapertussis PCR Not Detected     Chlamydophila pneumoniae PCR Not Detected     Mycoplasma pneumo by PCR Not Detected    Narrative:      In the setting of a positive respiratory panel with a viral infection PLUS a negative procalcitonin without other underlying concern for bacterial infection, consider observing off antibiotics or  discontinuation of antibiotics and continue supportive care. If the respiratory panel is positive for atypical bacterial infection (Bordetella pertussis, Chlamydophila pneumoniae, or Mycoplasma pneumoniae), consider antibiotic de-escalation to target atypical bacterial infection.          Assessment & Plan     Dyspnea    NICM (nonischemic cardiomyopathy) (HCC)    Atrial fibrillation, persistent (HCC)    Chronic HFrEF (heart failure with reduced ejection fraction) (HCC)    Cytokine release syndrome, grade 1     Dyspnea  - History of cardiomyopathy, hypertension, atrial fibrillation, heart failure  -Continue carvedilol, Xarelto, and spironolactone  -IV hydralazine as needed for systolic over 160  -Continuous cardiac monitoring  -Start Entresto  -Strict I&O's  -Continue diuresis  -EKG shows atrial fibrillation prolonged QT interval heart rate 97  -Echocardiogram showed LVEF of 26 to 30% with dilated left ventricle and right atrial cavities  -High-sensitivity troponin 31 and repeat of 30, troponin T delta 5  -Cardiology consult     Hyponatremia  -Total IV fluid  -Sodium 130 upon admission with sodium 139 today     COVID-19 infection  -Continue IV fluid  -Isolation precautions maintain  -Respiratory panel positive for COVID  -WBC within normal limits, monitor trend  -Blood cultures no growth at 24 hours  -Tylenol and ibuprofen as needed for fever     Transaminitis  -ALT 94, AST 80, alkaline phosphate 77, monitor trend    I discussed the patients findings and my recommendations with patient.     Discharge Diagnosis:      Dyspnea    NICM (nonischemic cardiomyopathy) (HCC)    Atrial fibrillation, persistent (HCC)    Chronic HFrEF (heart failure with reduced ejection fraction) (HCC)    Cytokine release syndrome, grade 1      Hospital Course  Patient is a 42 y.o. male presented with shortness of breath.  Patient is a heart failure patient but has not been seen by cardiology in a couple of years.  Patient presented with  shortness of breath and chest pain.  Respiratory viral panel showed patient positive for COVID as well.  WBC within normal limits.  Blood cultures show no growth at 24 hours.  Patient given IV fluids and Tylenol for fever as needed.  Patient maintain isolation precautions.  Chest x-ray showed cardiomegaly.  EKG showed atrial fibrillation with prolonged QT interval heart rate of 97.  Patient remained irregular but controlled.  Echocardiogram showed LVEF of 26 to 30% with dilated left ventricle and right atrial cavities which is decreased from last echocardiogram.  I sensitive troponins 31 with repeat of 30, T delta 5. Patient evaluated by cardiology.  Patient started on Entresto.  Patient continue on home medications and prescription sent to pharmacy.  Patient to take medications as prescribed.  Patient to follow-up with cardiology this coming week.  Patient to follow-up with PCP in 1 week for continued care management.  Patient to monitor blood pressures while at home.  Patient to sustain from alcohol use she elevated LFTs and heart failure.  Discharge heart failure education given to patient including daily weights patient encouraged smoking cessation as well.  Test and recommendations reviewed with patient and he agrees with treatment plan.  If symptoms worsen patient to call 911 or go to nearest ED.    Past Medical History:     Past Medical History:   Diagnosis Date   • A-fib (HCC)    • Cardiomyopathy (HCC)    • Hypertension        Past Surgical History:     Past Surgical History:   Procedure Laterality Date   • CARDIOVERSION         Social History:   Social History     Socioeconomic History   • Marital status: Single   Tobacco Use   • Smoking status: Every Day     Years: 20.00     Types: Cigarettes   • Smokeless tobacco: Current     Types: Chew   Vaping Use   • Vaping Use: Never used   Substance and Sexual Activity   • Alcohol use: Never   • Drug use: Never   • Sexual activity: Never       Procedures  Performed         Consults:   Consults     Date and Time Order Name Status Description    2/19/2023 12:33 AM Inpatient Cardiology Consult            Condition on Discharge:     Stable    Discharge Disposition  Home or Self Care    Discharge Medications     Discharge Medications      New Medications      Instructions Start Date   empagliflozin 10 MG tablet tablet  Commonly known as: JARDIANCE   10 mg, Oral, Daily      sacubitril-valsartan 24-26 MG tablet  Commonly known as: ENTRESTO   1 tablet, Oral, Every 12 Hours Scheduled         Changes to Medications      Instructions Start Date   carvedilol 12.5 MG tablet  Commonly known as: COREG  What changed: Another medication with the same name was added. Make sure you understand how and when to take each.   12.5 mg, Oral, Daily      carvedilol 12.5 MG tablet  Commonly known as: COREG  What changed: You were already taking a medication with the same name, and this prescription was added. Make sure you understand how and when to take each.   12.5 mg, Oral, Daily   Start Date: February 21, 2023     rivaroxaban 20 MG tablet  Commonly known as: Xarelto  What changed: Another medication with the same name was added. Make sure you understand how and when to take each.   20 mg, Oral, Daily      rivaroxaban 20 MG tablet  Commonly known as: XARELTO  What changed: You were already taking a medication with the same name, and this prescription was added. Make sure you understand how and when to take each.   20 mg, Oral, Daily With Dinner      spironolactone 25 MG tablet  Commonly known as: ALDACTONE  What changed: Another medication with the same name was added. Make sure you understand how and when to take each.   50 mg, Oral, Daily      spironolactone 50 MG tablet  Commonly known as: ALDACTONE  What changed: You were already taking a medication with the same name, and this prescription was added. Make sure you understand how and when to take each.   50 mg, Oral, Daily   Start Date:  February 21, 2023            Discharge Diet:   Diet Instructions     Diet: Cardiac      Discharge Diet: Cardiac          Activity at Discharge:   Activity Instructions     Activity as Tolerated      Measure Blood Pressure            Follow-up Appointments  Future Appointments   Date Time Provider Department Center   4/3/2023  1:50 PM Davon Kohler MD [facility]     Additional Instructions for the Follow-ups that You Need to Schedule     Discharge Follow-up with PCP   As directed       Currently Documented PCP:    Provider, No Known    PCP Phone Number:    None     Follow Up Details: 7-10 days               Test Results Pending at Discharge  Pending Labs     Order Current Status    Blood Culture - Blood, Arm, Left Preliminary result    Blood Culture - Blood, Arm, Right Preliminary result           Risk for Readmission (LACE) Score: 4 (2/20/2023  6:00 AM)          JOSE Saenz  02/20/23  12:10 EST

## 2023-02-21 NOTE — OUTREACH NOTE
Prep Survey    Flowsheet Row Responses   [facility] patient discharged from? [facility]   Is LACE score < 7 ? No   Eligibility Readm Mgmt   Discharge diagnosis Dyspnea   Does the patient have one of the following disease processes/diagnoses(primary or secondary)? CHF   Does the patient have Home health ordered? No   Is there a DME ordered? No   Medication alerts for this patient see AVS   Prep survey completed? Yes          Desiree BOCANEGRA - Registered Nurse

## 2023-02-23 ENCOUNTER — READMISSION MANAGEMENT (OUTPATIENT)
Dept: CALL CENTER | Facility: HOSPITAL | Age: 43
End: 2023-02-23
Payer: COMMERCIAL

## 2023-02-23 LAB
BACTERIA SPEC AEROBE CULT: NORMAL
BACTERIA SPEC AEROBE CULT: NORMAL

## 2023-02-23 NOTE — OUTREACH NOTE
CHF Week 1 Survey    Flowsheet Row Responses   [facility] facility patient discharged from? [facility]   Does the patient have one of the following disease processes/diagnoses(primary or secondary)? CHF   CHF Week 1 attempt successful? No   Unsuccessful attempts Attempt 1          Marianela COLE - Registered Nurse

## 2023-02-27 ENCOUNTER — READMISSION MANAGEMENT (OUTPATIENT)
Dept: CALL CENTER | Facility: HOSPITAL | Age: 43
End: 2023-02-27
Payer: COMMERCIAL

## 2023-02-27 LAB — QT INTERVAL: 433 MS

## 2023-02-27 NOTE — OUTREACH NOTE
CHF Week 1 Survey    Flowsheet Row Responses   [facility] [place] patient discharged from? [facility]   Does the patient have one of the following disease processes/diagnoses(primary or secondary)? CHF   CHF Week 1 attempt successful? Yes   Call start time 1615   Call end time 1617   Discharge diagnosis Dyspnea   Meds reviewed with patient/caregiver? Yes   Is the patient having any side effects they believe may be caused by any medication additions or changes? No   Does the patient have all medications ordered at discharge? Yes   Is the patient taking all medications as directed (includes completed medication regime)? Yes   Comments regarding appointments Cards appt on 4/3/23   Does the patient have a primary care provider?  No   PCP Nursing Intervention --  [pt refused help finding a PCP]   Has the patient kept scheduled appointments due by today? N/A   Has home health visited the patient within 72 hours of discharge? N/A   Pulse Ox monitoring None   Psychosocial issues? No   Did the patient receive a copy of their discharge instructions? Yes   Nursing interventions Reviewed instructions with patient   What is the patient's perception of their health status since discharge? Improving   Nursing interventions Nurse provided patient education   Is the patient/caregiver able to teach back the hierarchy of who to call/visit for symptoms/problems? PCP, Specialist, Home health nurse, Urgent Care, ED, 911 Yes   CHF Zone this Call Green Zone   Green Zone Patient reports doing well, No chest pain    CHF Week 1 call completed? Yes   Revoked No further contact(revokes)-requires comment   Is the patient interested in additional calls from an ambulatory ?  NOTE:  applies to high risk patients requiring additional follow-up. No   Would this patient benefit from a Referral to Amb Social Work? No   Graduated/Revoked comments Pt reports doing well, has no questions/concerns at this time   Call end time 1617          Elsy Reyes  Registered Nurse

## 2023-04-03 ENCOUNTER — OFFICE VISIT (OUTPATIENT)
Dept: CARDIOLOGY | Facility: CLINIC | Age: 43
End: 2023-04-03
Payer: COMMERCIAL

## 2023-04-03 VITALS
WEIGHT: 315 LBS | HEIGHT: 72 IN | BODY MASS INDEX: 42.66 KG/M2 | SYSTOLIC BLOOD PRESSURE: 150 MMHG | HEART RATE: 68 BPM | OXYGEN SATURATION: 95 % | DIASTOLIC BLOOD PRESSURE: 86 MMHG

## 2023-04-03 DIAGNOSIS — I48.0 PAROXYSMAL ATRIAL FIBRILLATION: ICD-10-CM

## 2023-04-03 DIAGNOSIS — I10 ESSENTIAL HYPERTENSION: Primary | ICD-10-CM

## 2023-04-03 DIAGNOSIS — Z79.01 LONG TERM (CURRENT) USE OF ANTICOAGULANTS: ICD-10-CM

## 2023-04-03 DIAGNOSIS — I42.0 DILATED CARDIOMYOPATHY: ICD-10-CM

## 2023-04-03 DIAGNOSIS — I50.22 CHRONIC HFREF (HEART FAILURE WITH REDUCED EJECTION FRACTION): ICD-10-CM

## 2023-04-03 DIAGNOSIS — E66.01 MORBID OBESITY WITH BMI OF 45.0-49.9, ADULT: ICD-10-CM

## 2023-04-03 PROCEDURE — 99214 OFFICE O/P EST MOD 30 MIN: CPT | Performed by: INTERNAL MEDICINE

## 2023-04-03 RX ORDER — SPIRONOLACTONE 50 MG/1
50 TABLET, FILM COATED ORAL DAILY
Qty: 90 TABLET | Refills: 3 | Status: SHIPPED | OUTPATIENT
Start: 2023-04-03 | End: 2023-04-11 | Stop reason: SDUPTHER

## 2023-04-03 RX ORDER — BISOPROLOL FUMARATE AND HYDROCHLOROTHIAZIDE 10; 6.25 MG/1; MG/1
1 TABLET ORAL DAILY
Qty: 90 TABLET | Refills: 3 | Status: SHIPPED | OUTPATIENT
Start: 2023-04-03 | End: 2023-04-11 | Stop reason: SDUPTHER

## 2023-04-03 NOTE — PROGRESS NOTES
Subjective:     Encounter Date:04/03/2023      Patient ID: Marquise Short is a 42 y.o. male.    Chief Complaint and history of present illness: Follow-up for CHF, cardiomyopathy, A-fib, anticoagulation          Mr.Scott DALJIT Short has PMH of     # A.Fib, s/p AZALIA guided cardioversion 06/27/2018, 1/7/2019  # cardiomyopathy possibly tachycardia induced cardiomyopathy EF of 30% by AZALIA 06/2018  # abnormal Lexiscan with fixed inferior defect 06/27/2018, LVEF of 20%, trans esophageal echo  3/21/2019 EF of 40%  #  hypertension,   # morbid obesity  #  cigarette smoker  # EtOH use     Here for hospital follow-up.  Patient was recently in the hospital with with COVID-19 infection and had elevated troponin.  Patient had previous history of A-fib and cardiomyopathy had not been following up.  Patient states he is feeling better since he has gotten out of the hospital.    Patient's arterial blood pressures elevated at 150/86, heart rate 68, O2 sat of 95% on room air.  BMI is over 49.  Patient is a current smoker.         Presented to the ED on 2/18/2023 with shortness of breath and inability to stay awake.  He denied any chest pain or palpitations.   Patient has ERIBERTO and does not wear CPAP he also continues to drink alcohol daily to help him sleep.     Labs from 2/19/2023 reveal elevated HS troponin is very minimally elevated 25->31->30. Pro bnp 5000  2/19/2023 ALT 94 AST 80 .2/20/2023: glucose 104            ASSESSMENT:      Paroxysmal A. fib on long-term anticoagulation   Hypertension  Chronic systolic and diastolic congestive heart failure due to atrial fibrillation and tachycardia induced cardiomyopathy, HCVD  chest pain,  equivocal stress test with fixed inferior defect 06/29/2018  snores, obesity  ERIBERTO not on CPAP therapy   Hypertension/ HCVD  Elevated liver enzymes ---possibly due to COVID or EtOH use or hepatic congestion         PLAN:    Reviewed patient's condition with patient.  Counseled on importance of  compliance.  Patient is taking carvedilol once a day.  We will change it to bisoprolol hydrochlorothiazide 10/6.25.  We will continue Entresto Aldactone and Xarelto and Jardiance.  We will check labs and follow-up in heart failure clinic in 6 months.       [id number] SCORE   [id number] Score: 2 (2/19/2023  9:00 AM)  Continue Xarelto      Continue bisoprolol hydrochlorothiazide, Entresto and spironolactone for cardiomyopathy           Echocardiogram 2/19/2023  Left ventricular ejection fraction appears to be 26 - 30%.  •  The left ventricular cavity is dilated.  •  Left ventricular diastolic dysfunction is noted.  •  The right atrial cavity is mildly  dilated.     Patient would need ICD since his EF has not improved on medical therapy.  Patient has not been compliant on guideline directed medical therapy therefore will follow-up.  If he is compliant on GDMT will repeat echo to evaluate LV function.           Copied text in this portion of the note has been reviewed and is accurate       Procedures    Copied text in this portion of the note has been reviewed and is accurate as of 4/3/2023  The following portions of the patient's history were reviewed and updated as appropriate: allergies, current medications, past family history, past medical history, past social history, past surgical history and problem list.    Assessment:         MDM     Diagnosis Plan   1. Essential hypertension  Basic Metabolic Panel    BNP      2. Chronic HFrEF (heart failure with reduced ejection fraction)  Basic Metabolic Panel    BNP      3. Dilated cardiomyopathy  Basic Metabolic Panel    BNP      4. Paroxysmal atrial fibrillation  Basic Metabolic Panel    BNP      5. Long term (current) use of anticoagulants  Basic Metabolic Panel    BNP      6. Morbid obesity with BMI of 45.0-49.9, adult  Basic Metabolic Panel    BNP             Plan:               Past Medical History:  Past Medical History:   Diagnosis Date   • A-fib    •  "Cardiomyopathy    • Hypertension      Past Surgical History:  Past Surgical History:   Procedure Laterality Date   • CARDIOVERSION        Allergies:  No Known Allergies  Home Meds:  Current Meds:     Current Outpatient Medications:   •  empagliflozin (JARDIANCE) 10 MG tablet tablet, Take 1 tablet by mouth Daily., Disp: 90 tablet, Rfl: 3  •  rivaroxaban (Xarelto) 20 MG tablet, Take 1 tablet by mouth Daily., Disp: 30 tablet, Rfl: 1  •  sacubitril-valsartan (ENTRESTO) 24-26 MG tablet, Take 1 tablet by mouth Every 12 (Twelve) Hours., Disp: 180 tablet, Rfl: 3  •  spironolactone (ALDACTONE) 50 MG tablet, Take 1 tablet by mouth Daily., Disp: 90 tablet, Rfl: 3  •  bisoprolol-hydrochlorothiazide (ZIAC) 10-6.25 MG per tablet, Take 1 tablet by mouth Daily., Disp: 90 tablet, Rfl: 3  Social History:   Social History     Tobacco Use   • Smoking status: Every Day     Years: 20.00     Types: Cigarettes   • Smokeless tobacco: Current     Types: Chew   Substance Use Topics   • Alcohol use: Never      Family History:  Family History   Problem Relation Age of Onset   • Heart attack Paternal Grandfather               Review of Systems   Constitutional: Negative for malaise/fatigue.   Cardiovascular: Negative for chest pain, leg swelling and palpitations.   Respiratory: Positive for shortness of breath.    Skin: Negative for rash.   Neurological: Negative for dizziness, light-headedness and numbness.     All other systems are negative         Objective:     Physical Exam  /86   Pulse 68   Ht 182.9 cm (72\")   Wt (!) 164 kg (362 lb)   SpO2 95%   BMI 49.10 kg/m²   General:  Appears in no acute distress  Eyes: Sclera is anicteric,  conjunctiva is clear   HEENT:  No JVD.  No carotid bruits  Respiratory: Respirations regular and unlabored at rest.  Clear to auscultation  Cardiovascular: S1,S2 Regular rate and rhythm. No murmur, rub or gallop auscultated.   Extremities: No digital clubbing or cyanosis, no edema  Skin: Color pink. " "Skin warm and dry to touch. No rashes  No xanthoma  Neuro: Alert and awake.    Lab Reviewed:         Davon Kohler MD  4/3/2023 13:31 EDT      EMR Dragon/Transcription:   \"Dictated utilizing Dragon dictation\".      Had elevated  "

## 2023-04-10 RX ORDER — RIVAROXABAN 20 MG/1
TABLET, FILM COATED ORAL
Qty: 90 TABLET | Refills: 1 | Status: SHIPPED | OUTPATIENT
Start: 2023-04-10

## 2023-04-10 RX ORDER — SPIRONOLACTONE 25 MG/1
50 TABLET ORAL DAILY
Qty: 60 TABLET | Refills: 1 | OUTPATIENT
Start: 2023-04-10

## 2023-04-10 NOTE — TELEPHONE ENCOUNTER
Rx Refill Note  Requested Prescriptions     Pending Prescriptions Disp Refills   • spironolactone (ALDACTONE) 25 MG tablet [Pharmacy Med Name: SPIRONOLACTONE 25MG TABLETS] 60 tablet 1     Sig: TAKE 2 TABLETS BY MOUTH DAILY   • Xarelto 20 MG tablet [Pharmacy Med Name: XARELTO 20MG TABLETS] 30 tablet 1     Sig: TAKE 1 TABLET BY MOUTH DAILY      Last office visit with prescribing clinician: 4/3/2023   Last telemedicine visit with prescribing clinician: 7/5/2023   Next office visit with prescribing clinician: 4/4/2024                         Would you like a call back once the refill request has been completed: [] Yes [] No    If the office needs to give you a call back, can they leave a voicemail: [] Yes [] No    Jaleesa Iqbal MA  04/10/23, 09:25 EDT

## 2023-04-11 NOTE — TELEPHONE ENCOUNTER
Caller: Marquise Short    Relationship: Self    Best call back number: [phone]    Requested Prescriptions:   Requested Prescriptions     Pending Prescriptions Disp Refills   • bisoprolol-hydrochlorothiazide (ZIAC) 10-6.25 MG per tablet 90 tablet 3     Sig: Take 1 tablet by mouth Daily.   • empagliflozin (JARDIANCE) 10 MG tablet tablet 90 tablet 3     Sig: Take 1 tablet by mouth Daily.   • sacubitril-valsartan (ENTRESTO) 24-26 MG tablet 180 tablet 3     Sig: Take 1 tablet by mouth Every 12 (Twelve) Hours.   • spironolactone (ALDACTONE) 50 MG tablet 90 tablet 3     Sig: Take 1 tablet by mouth Daily.        Pharmacy where request should be sent: [id number] - [address] AT [facility] OF  [address] [facility] - [phone] [facility] [phone] FX       PATIENT WOULD LIKE TO USE ABOVE PHARMACY FROM NOW ON           Last office visit with prescribing clinician: 4/3/2023   Last telemedicine visit with prescribing clinician: 7/5/2023   Next office visit with prescribing clinician: 4/4/2024         Does the patient have less than a 3 day supply:  [x] Yes  [] No    Would you like a call back once the refill request has been completed: [x] Yes [] No    If the office needs to give you a call back, can they leave a voicemail: [x] Yes [] No    Agnieszka Delgado Rep   04/11/23 12:04 EDT

## 2023-04-12 RX ORDER — SPIRONOLACTONE 50 MG/1
50 TABLET, FILM COATED ORAL DAILY
Qty: 90 TABLET | Refills: 3 | Status: SHIPPED | OUTPATIENT
Start: 2023-04-12

## 2023-04-12 RX ORDER — BISOPROLOL FUMARATE AND HYDROCHLOROTHIAZIDE 10; 6.25 MG/1; MG/1
1 TABLET ORAL DAILY
Qty: 90 TABLET | Refills: 3 | Status: SHIPPED | OUTPATIENT
Start: 2023-04-12

## 2023-04-12 NOTE — TELEPHONE ENCOUNTER
Rx Refill Note  Requested Prescriptions     Pending Prescriptions Disp Refills   • bisoprolol-hydrochlorothiazide (ZIAC) 10-6.25 MG per tablet 90 tablet 3     Sig: Take 1 tablet by mouth Daily.   • empagliflozin (JARDIANCE) 10 MG tablet tablet 90 tablet 3     Sig: Take 1 tablet by mouth Daily.   • sacubitril-valsartan (ENTRESTO) 24-26 MG tablet 180 tablet 3     Sig: Take 1 tablet by mouth Every 12 (Twelve) Hours.   • spironolactone (ALDACTONE) 50 MG tablet 90 tablet 3     Sig: Take 1 tablet by mouth Daily.      Last office visit with prescribing clinician: 4/3/2023   Last telemedicine visit with prescribing clinician: 7/5/2023   Next office visit with prescribing clinician: 4/4/2024                         Would you like a call back once the refill request has been completed: [] Yes [] No    If the office needs to give you a call back, can they leave a voicemail: [] Yes [] No    Jaleesa Iqbal MA  04/12/23, 08:46 EDT

## 2023-07-05 PROBLEM — Z79.01 LONG TERM CURRENT USE OF ANTICOAGULANT THERAPY: Status: ACTIVE | Noted: 2018-08-20

## 2023-07-05 PROBLEM — I10 HYPERTENSION: Status: ACTIVE | Noted: 2018-08-20

## 2023-07-05 PROBLEM — I11.9 HYPERTENSIVE CARDIOVASCULAR DISEASE: Status: ACTIVE | Noted: 2018-12-03

## 2023-07-05 PROBLEM — R07.9 CHEST PAIN: Status: ACTIVE | Noted: 2018-08-20

## 2023-07-10 PROBLEM — E66.01 MORBID OBESITY WITH BMI OF 50.0-59.9, ADULT: Status: ACTIVE | Noted: 2023-07-10

## 2023-08-07 ENCOUNTER — HOSPITAL ENCOUNTER (OUTPATIENT)
Dept: CARDIOLOGY | Facility: HOSPITAL | Age: 43
Discharge: HOME | End: 2023-08-07
Payer: COMMERCIAL

## 2023-08-07 ENCOUNTER — OFFICE VISIT (OUTPATIENT)
Dept: CARDIOLOGY | Facility: CLINIC | Age: 43
End: 2023-08-07
Payer: COMMERCIAL

## 2023-08-07 VITALS
SYSTOLIC BLOOD PRESSURE: 142 MMHG | WEIGHT: 315 LBS | HEIGHT: 72 IN | DIASTOLIC BLOOD PRESSURE: 94 MMHG | BODY MASS INDEX: 42.66 KG/M2 | HEART RATE: 67 BPM

## 2023-08-07 DIAGNOSIS — Z79.01 LONG TERM (CURRENT) USE OF ANTICOAGULANTS: ICD-10-CM

## 2023-08-07 DIAGNOSIS — I42.8 NICM (NONISCHEMIC CARDIOMYOPATHY): ICD-10-CM

## 2023-08-07 DIAGNOSIS — I50.42 CHRONIC COMBINED SYSTOLIC AND DIASTOLIC CONGESTIVE HEART FAILURE: ICD-10-CM

## 2023-08-07 DIAGNOSIS — I48.0 PAROXYSMAL ATRIAL FIBRILLATION: ICD-10-CM

## 2023-08-07 DIAGNOSIS — Z79.01 LONG TERM CURRENT USE OF ANTICOAGULANT THERAPY: ICD-10-CM

## 2023-08-07 DIAGNOSIS — E66.01 MORBID OBESITY WITH BMI OF 50.0-59.9, ADULT: ICD-10-CM

## 2023-08-07 DIAGNOSIS — E78.5 DYSLIPIDEMIA: ICD-10-CM

## 2023-08-07 DIAGNOSIS — I10 ESSENTIAL HYPERTENSION: ICD-10-CM

## 2023-08-07 DIAGNOSIS — R53.83 FATIGUE, UNSPECIFIED TYPE: Primary | ICD-10-CM

## 2023-08-07 DIAGNOSIS — I50.22 CHRONIC HFREF (HEART FAILURE WITH REDUCED EJECTION FRACTION): ICD-10-CM

## 2023-08-07 PROCEDURE — 93306 TTE W/DOPPLER COMPLETE: CPT

## 2023-08-07 PROCEDURE — 93306 TTE W/DOPPLER COMPLETE: CPT | Performed by: INTERNAL MEDICINE

## 2023-08-07 PROCEDURE — 25010000002 SULFUR HEXAFLUORIDE MICROSPH 60.7-25 MG RECONSTITUTED SUSPENSION: Performed by: INTERNAL MEDICINE

## 2023-08-07 RX ORDER — SPIRONOLACTONE 50 MG/1
50 TABLET, FILM COATED ORAL DAILY
Qty: 90 TABLET | Refills: 3 | Status: SHIPPED | OUTPATIENT
Start: 2023-08-07

## 2023-08-07 RX ADMIN — SULFUR HEXAFLUORIDE 3 ML: KIT at 10:05

## 2023-08-07 NOTE — PROGRESS NOTES
Subjective:     Encounter Date:08/07/2023      Patient ID: Marquise Short is a 43 y.o. male.    Chief Complaint and history of present illness:       Follow-up for CHF, cardiomyopathy, A-fib, anticoagulation           Mr.Scott DALJIT Short has PMH of     # A.Fib, s/p AZALIA guided cardioversion 06/27/2018, 1/7/2019  # cardiomyopathy possibly tachycardia induced cardiomyopathy EF of 30% by AZALIA 06/2018  # abnormal Lexiscan with fixed inferior defect 06/27/2018, LVEF of 20%, trans esophageal echo  3/21/2019 EF of 40%  #  hypertension,   # morbid obesity  #  cigarette smoker  # EtOH use      Here for follow-up.  Patient is complaining of fatigue denies any chest pain or shortness of breath.  Reportedly snores.     Patient's arterial blood pressures elevated at 142/94, heart rate 67 bpm.   BMI is over 50.  Patient is a current smoker.         Presented to the ED on 2/18/2023 with shortness of breath and inability to stay awake.  He denied any chest pain or palpitations.   Patient has ERIBERTO and does not wear CPAP he also continues to drink alcohol daily to help him sleep.      Labs from 2/19/2023 reveal elevated HS troponin is very minimally elevated 25->31->30. Pro bnp 5000  2/19/2023 ALT 94 AST 80 .2/20/2023: glucose 104            ASSESSMENT:        Permanent atrial fib, flutter   on long-term anticoagulation   Hypertension  Chronic systolic and diastolic congestive heart failure due to atrial fibrillation and tachycardia induced cardiomyopathy, HCVD  chest pain,  equivocal stress test with fixed inferior defect 06/29/2018  snores, obesity  ERIBERTO not on CPAP therapy   Hypertension/ HCVD  Elevated liver enzymes ---possibly due to COVID or EtOH use or hepatic congestion         PLAN:     Reviewed patient's condition with patient.  Counseled on importance of compliance.  Blood pressure is elevated will increase Entresto to 49 mg twice daily.  We will continue Entresto Aldactone bisoprolol and Xarelto and Jardiance.  We will    follow-up in heart failure clinic in 6 months.  Sent for sleep apnea evaluation.     [id number] SCORE   [id number] Score: 2 (2/19/2023  9:00 AM)  Continue Xarelto      Continue bisoprolol hydrochlorothiazide, Entresto and spironolactone for cardiomyopathy            Echocardiogram 2/19/2023  Left ventricular ejection fraction appears to be 26 - 30%.    The left ventricular cavity is dilated.    Left ventricular diastolic dysfunction is noted.    The right atrial cavity is mildly  dilated.    Repeat echo 8/7/2023 reveals EF of 40%           ECG 12 Lead    Date/Time: 8/7/2023 10:33 AM  Performed by: Davon Kohler MD  Authorized by: Davon Kohler MD   Comparison: compared with previous ECG from 2/19/2023  Comparison to previous ECG: EKG done today reviewed/interpreted by me reveals A-fib flutter at the rate of 63 bpm with nonspecific ST-T changes, no new change compared EKG from 2/19/2023.        Copied text in this portion of the note has been reviewed and is accurate as of 8/7/2023  The following portions of the patient's history were reviewed and updated as appropriate: allergies, current medications, past family history, past medical history, past social history, past surgical history and problem list.    Assessment:         [facility]       Diagnosis Plan   1. Fatigue, unspecified type  Ambulatory Referral to Sleep Medicine      2. Essential hypertension  Ambulatory Referral to Sleep Medicine      3. NICM (nonischemic cardiomyopathy)  Ambulatory Referral to Sleep Medicine      4. Paroxysmal atrial fibrillation  Ambulatory Referral to Sleep Medicine      5. Long term current use of anticoagulant therapy  Ambulatory Referral to Sleep Medicine      6. Chronic HFrEF (heart failure with reduced ejection fraction)  Ambulatory Referral to Sleep Medicine      7. Morbid obesity with BMI of 50.0-59.9, adult  Ambulatory Referral to Sleep Medicine      8. Long term (current) use of anticoagulants   "Ambulatory Referral to Sleep Medicine      9. Dyslipidemia  Ambulatory Referral to Sleep Medicine             Plan:               Past Medical History:  Past Medical History:   Diagnosis Date    A-fib     Cardiomyopathy     Hypertension      Past Surgical History:  Past Surgical History:   Procedure Laterality Date    CARDIOVERSION        Allergies:  No Known Allergies  Home Meds:  Current Meds:     Current Outpatient Medications:     bisoprolol-hydrochlorothiazide (ZIAC) 10-6.25 MG per tablet, Take 1 tablet by mouth Daily., Disp: 90 tablet, Rfl: 3    empagliflozin (JARDIANCE) 10 MG tablet tablet, Take 1 tablet by mouth Daily., Disp: 90 tablet, Rfl: 3    spironolactone (ALDACTONE) 50 MG tablet, Take 1 tablet by mouth Daily., Disp: 90 tablet, Rfl: 3    Xarelto 20 MG tablet, TAKE 1 TABLET BY MOUTH DAILY, Disp: 90 tablet, Rfl: 1    sacubitril-valsartan (ENTRESTO) 49-51 MG tablet, Take 1 tablet by mouth 2 (Two) Times a Day., Disp: 180 tablet, Rfl: 3  No current facility-administered medications for this visit.  Social History:   Social History     Tobacco Use    Smoking status: Every Day     Years: 20.00     Types: Cigarettes     Passive exposure: Current    Smokeless tobacco: Current     Types: Chew   Substance Use Topics    Alcohol use: Never      Family History:  Family History   Problem Relation Age of Onset    Heart attack Paternal Grandfather               Review of Systems   Constitutional: Positive for malaise/fatigue.   Cardiovascular:  Positive for leg swelling and palpitations. Negative for chest pain.   Respiratory:  Positive for shortness of breath.    Neurological:  Positive for numbness. Negative for dizziness.   All other systems are negative         Objective:     Physical Exam  /94 (BP Location: Left arm, Patient Position: Sitting, Cuff Size: Large Adult)   Pulse 67   Ht 182.9 cm (72\")   Wt (!) 177 kg (391 lb)   BMI 53.03 kg/mý   General:  Appears in no acute distress  Eyes: Sclera is " "anicteric,  conjunctiva is clear   HEENT:  No JVD.  No carotid bruits  Respiratory: Respirations regular and unlabored at rest.  Clear to auscultation  Cardiovascular: S1,S2 Regular rate and rhythm. No murmur, rub or gallop auscultated.   Extremities: No digital clubbing or cyanosis, no edema  Skin: Color pink. Skin warm and dry to touch. No rashes  No xanthoma  Neuro: Alert and awake.    Lab Reviewed:         Davon Kohler MD  8/7/2023 10:36 EDT      EMR Dragon/Transcription:   \"Dictated utilizing Dragon dictation\".        "

## 2023-08-14 LAB
BH CV ECHO MEAS - ACS: 2.34 CM
BH CV ECHO MEAS - AO MAX PG: 5.8 MMHG
BH CV ECHO MEAS - AO MEAN PG: 3.5 MMHG
BH CV ECHO MEAS - AO ROOT DIAM: 4.3 CM
BH CV ECHO MEAS - AO V2 MAX: 120.2 CM/SEC
BH CV ECHO MEAS - AO V2 VTI: 24.4 CM
BH CV ECHO MEAS - AVA(I,D): 2.18 CM2
BH CV ECHO MEAS - EDV(CUBED): 205.3 ML
BH CV ECHO MEAS - EDV(MOD-SP4): 193.8 ML
BH CV ECHO MEAS - EF(MOD-BP): 42 %
BH CV ECHO MEAS - EF(MOD-SP4): 42 %
BH CV ECHO MEAS - ESV(CUBED): 102 ML
BH CV ECHO MEAS - ESV(MOD-SP4): 112.5 ML
BH CV ECHO MEAS - FS: 20.8 %
BH CV ECHO MEAS - IVS/LVPW: 0.83 CM
BH CV ECHO MEAS - IVSD: 0.99 CM
BH CV ECHO MEAS - LA DIMENSION: 4.3 CM
BH CV ECHO MEAS - LV DIASTOLIC VOL/BSA (35-75): 69.6 CM2
BH CV ECHO MEAS - LV MASS(C)D: 270.7 GRAMS
BH CV ECHO MEAS - LV MAX PG: 1.2 MMHG
BH CV ECHO MEAS - LV MEAN PG: 0.79 MMHG
BH CV ECHO MEAS - LV SYSTOLIC VOL/BSA (12-30): 40.4 CM2
BH CV ECHO MEAS - LV V1 MAX: 54.8 CM/SEC
BH CV ECHO MEAS - LV V1 VTI: 12.8 CM
BH CV ECHO MEAS - LVIDD: 5.9 CM
BH CV ECHO MEAS - LVIDS: 4.7 CM
BH CV ECHO MEAS - LVOT AREA: 4.2 CM2
BH CV ECHO MEAS - LVOT DIAM: 2.3 CM
BH CV ECHO MEAS - LVPWD: 1.2 CM
BH CV ECHO MEAS - MR MAX PG: 100.7 MMHG
BH CV ECHO MEAS - MR MAX VEL: 501.8 CM/SEC
BH CV ECHO MEAS - MV E MAX VEL: 96.4 CM/SEC
BH CV ECHO MEAS - MV MAX PG: 5.4 MMHG
BH CV ECHO MEAS - MV MEAN PG: 1.99 MMHG
BH CV ECHO MEAS - MV V2 VTI: 22.7 CM
BH CV ECHO MEAS - MVA(VTI): 2.34 CM2
BH CV ECHO MEAS - PA ACC TIME: 0.1 SEC
BH CV ECHO MEAS - PA V2 MAX: 94.7 CM/SEC
BH CV ECHO MEAS - PULM DIAS VEL: 55.2 CM/SEC
BH CV ECHO MEAS - PULM S/D: 0.87
BH CV ECHO MEAS - PULM SYS VEL: 47.8 CM/SEC
BH CV ECHO MEAS - RAP SYSTOLE: 3 MMHG
BH CV ECHO MEAS - RV MAX PG: 1.48 MMHG
BH CV ECHO MEAS - RV V1 MAX: 60.8 CM/SEC
BH CV ECHO MEAS - RV V1 VTI: 12.6 CM
BH CV ECHO MEAS - RVDD: 3 CM
BH CV ECHO MEAS - SI(MOD-SP4): 29.2 ML/M2
BH CV ECHO MEAS - SV(LVOT): 53.1 ML
BH CV ECHO MEAS - SV(MOD-SP4): 81.3 ML

## 2023-10-23 RX ORDER — RIVAROXABAN 20 MG/1
TABLET, FILM COATED ORAL
Qty: 90 TABLET | Refills: 3 | Status: SHIPPED | OUTPATIENT
Start: 2023-10-23

## 2024-02-08 NOTE — PROGRESS NOTES
"    Subjective:     Encounter Date: 02/19/2024      Patient ID: Marquise Short is a 43 y.o. male.    Chief Complaint: 3 month follow up with labs; HFrEF     History of Present Illness    Mr.Scott DALJIT Short has PMH of     # A.Fib, s/p AZALIA guided cardioversion 06/27/2018, 1/7/2019  # cardiomyopathy possibly tachycardia induced cardiomyopathy EF of 30% by AZALIA 06/2018  # abnormal Lexiscan with fixed inferior defect 06/27/2018, LVEF of 20%, trans esophageal echo  3/21/2019 EF of 40%-- improved to 45-50% 8/2023  #  hypertension,   # morbid obesity  #  cigarette smoker  # EtOH use    # ERIBERTO, does not wear CPAP    Here for 6 month follow up.   Patient reports doing well overall.  He denies any chest pain, shortness of breath or palpitations.  He does have some lower extremity edema and tingling in his feet at times, especially if sitting for long periods or in a \"weird\" position.    Patient reports eating from a lot of food trucks recently.  He continues to drink beer daily.       Blood pressure today is 136/77  HR 81 oxygen 99% on room air.  Weight 410lbs up form 391lbs in August 2023  BMI 55              Echocardiogram 8/27/2023  Lumason contrast was given to better evaluate LV function  Normal LV size and possible apical hypokinesis, estimated LV ejection fraction around 45 to 50%  Normal RV size  Normal atrial size  Pulmonic valve is not well visualized.  Aortic valve, mitral valve, tricuspid valve appears structurally normal, no significant regurgitation seen.  No pericardial effusion seen.  Proximal aorta appears normal in size.    Lab Review:      Labs from 2/19/2023 reveal elevated HS troponin is very minimally elevated 25->31->30. Pro bnp 5000  2/19/2023 ALT 94 AST 80 .2/20/2023: glucose 104      The following portions of the patient's history were reviewed and updated as appropriate: allergies, current medications, past family history, past medical history, past social history, past surgical history, and problem " "list.    Review of Systems   Constitutional: Negative for malaise/fatigue.   Cardiovascular:  Positive for leg swelling. Negative for chest pain, dyspnea on exertion, irregular heartbeat, palpitations and paroxysmal nocturnal dyspnea.   Respiratory:  Negative for cough and shortness of breath.    Gastrointestinal:  Negative for abdominal pain, nausea and vomiting.   Neurological:  Positive for numbness (in feet). Negative for dizziness, focal weakness, headaches and light-headedness.   All other systems reviewed and are negative.      Past Medical History:   Diagnosis Date    A-fib     Cardiomyopathy     Hypertension      Past Surgical History:   Procedure Laterality Date    CARDIOVERSION       /77   Pulse 81   Ht 182.9 cm (72.01\")   Wt (!) 186 kg (410 lb)   SpO2 99%   BMI 55.59 kg/m²   Family History   Problem Relation Age of Onset    Heart attack Paternal Grandfather        Current Outpatient Medications:     bisoprolol-hydrochlorothiazide (ZIAC) 10-6.25 MG per tablet, Take 1 tablet by mouth Daily., Disp: 90 tablet, Rfl: 3    empagliflozin (JARDIANCE) 10 MG tablet tablet, Take 1 tablet by mouth Daily., Disp: 90 tablet, Rfl: 3    rivaroxaban (Xarelto) 20 MG tablet, TAKE 1 TABLET BY MOUTH DAILY, Disp: 90 tablet, Rfl: 3    spironolactone (ALDACTONE) 50 MG tablet, Take 1 tablet by mouth Daily., Disp: 90 tablet, Rfl: 3    sacubitril-valsartan (Entresto)  MG tablet, Take 1 tablet by mouth 2 (Two) Times a Day., Disp: 60 tablet, Rfl: 3    No Known Allergies    Social History     Socioeconomic History    Marital status: Single   Tobacco Use    Smoking status: Every Day     Years: 20     Types: Cigarettes     Passive exposure: Current    Smokeless tobacco: Current     Types: Chew   Vaping Use    Vaping Use: Never used   Substance and Sexual Activity    Alcohol use: Never    Drug use: Never    Sexual activity: Never                Objective:     Vitals reviewed.   Constitutional:       Appearance: Not in " distress. Morbidly obese.   Neck:      Vascular: No JVR. JVD normal.   Pulmonary:      Effort: Pulmonary effort is normal.      Breath sounds: Normal breath sounds. No wheezing. No rhonchi. No rales.   Chest:      Chest wall: Not tender to palpatation.   Cardiovascular:      PMI at left midclavicular line. Normal rate. Regular rhythm. Normal S1. Normal S2.       Murmurs: There is no murmur.      No gallop.  No click. No rub.   Pulses:     Intact distal pulses.   Edema:     Pretibial: bilateral trace edema of the pretibial area.     Ankle: bilateral trace edema of the ankle.     Feet: bilateral trace edema of the feet.  Abdominal:      General: Bowel sounds are normal.      Palpations: Abdomen is soft.      Tenderness: There is no abdominal tenderness.   Musculoskeletal: Normal range of motion.         General: No tenderness. Skin:     General: Skin is warm and dry.   Neurological:      General: No focal deficit present.      Mental Status: Alert and oriented to person, place and time.       Procedures                  Assessment:     MDM       Diagnosis Plan   1. NICM (nonischemic cardiomyopathy)  Comprehensive Metabolic Panel    CBC & Differential    Lipid Panel    BNP      2. Chronic HFrEF (heart failure with reduced ejection fraction)  Comprehensive Metabolic Panel    CBC & Differential    Lipid Panel      3. Primary hypertension  Comprehensive Metabolic Panel    CBC & Differential    Lipid Panel    BNP      4. Morbid obesity with BMI of 50.0-59.9, adult        5. Long term current use of anticoagulant therapy        6. Paroxysmal A-fib                         Plan:   Chart reviewed   No labs since Feb 2023  Adivsed patient to get his follow up labs done. CMP, CBC BNP lipid panel ordered   He will go to the hospital today to get them done   Reviewed repeat echocardiogram with patient from 8/2023 showing improved EF to 45-50% from 26-30% in Feb 2023    We will continue bisoprolol-hctz, jardiance, entresto and  aldactone   Increase entresto to 96-103mg BID   Patient reports sometimes not remembering to take his second dose of Entresto.  Advised he set a reminder on his phone.       [id number] SCORE   [id number] Score: 2 (2/9/2024  9:23 AM)    Continue Xarelto   Continue bisoprolol for rate control     CHF care reviewed   - Daily weight:  same time every day, same clothing   - Low Salt (sodium) diet   - Watch fluid intake         Advised patient to abstain from EtOH and smoking     Electronically signed by JOSE Scott, 02/09/24, 9:23 AM EST.          This document is intended for medical expert use only.  Reading of this document by patients and/or patient's family without participating medical staff guidance may result in misinterpretation and unintended morbidity. Any interpretation of such data is the responsibility of the patient and/or family member responsible for the patient in concert with their primary or specialist providers, not to be left for sources of online search as such as [employer], [employer] or similar queries.  Relying on these approaches to knowledge may result in misinterpretation, misguided goals of care and even death should patient or family members try recommendations outside of the realm of professional medical care in a supervised inpatient environment.     F: None  E: Replete PRN  N: Regular Diet

## 2024-02-09 ENCOUNTER — LAB (OUTPATIENT)
Dept: LAB | Facility: HOSPITAL | Age: 44
End: 2024-02-09
Payer: COMMERCIAL

## 2024-02-09 ENCOUNTER — OFFICE VISIT (OUTPATIENT)
Dept: CARDIOLOGY | Facility: CLINIC | Age: 44
End: 2024-02-09
Payer: COMMERCIAL

## 2024-02-09 VITALS
HEART RATE: 81 BPM | BODY MASS INDEX: 42.66 KG/M2 | WEIGHT: 315 LBS | DIASTOLIC BLOOD PRESSURE: 77 MMHG | OXYGEN SATURATION: 99 % | HEIGHT: 72 IN | SYSTOLIC BLOOD PRESSURE: 136 MMHG

## 2024-02-09 DIAGNOSIS — I42.0 DILATED CARDIOMYOPATHY: ICD-10-CM

## 2024-02-09 DIAGNOSIS — I48.0 PAROXYSMAL A-FIB: ICD-10-CM

## 2024-02-09 DIAGNOSIS — E66.01 MORBID OBESITY WITH BMI OF 45.0-49.9, ADULT: ICD-10-CM

## 2024-02-09 DIAGNOSIS — I10 PRIMARY HYPERTENSION: ICD-10-CM

## 2024-02-09 DIAGNOSIS — I10 ESSENTIAL HYPERTENSION: ICD-10-CM

## 2024-02-09 DIAGNOSIS — E66.01 MORBID OBESITY WITH BMI OF 50.0-59.9, ADULT: ICD-10-CM

## 2024-02-09 DIAGNOSIS — I48.0 PAROXYSMAL ATRIAL FIBRILLATION: ICD-10-CM

## 2024-02-09 DIAGNOSIS — I50.22 CHRONIC HFREF (HEART FAILURE WITH REDUCED EJECTION FRACTION): ICD-10-CM

## 2024-02-09 DIAGNOSIS — Z79.01 LONG TERM CURRENT USE OF ANTICOAGULANT THERAPY: ICD-10-CM

## 2024-02-09 DIAGNOSIS — I42.8 NICM (NONISCHEMIC CARDIOMYOPATHY): Primary | ICD-10-CM

## 2024-02-09 DIAGNOSIS — I42.8 NICM (NONISCHEMIC CARDIOMYOPATHY): ICD-10-CM

## 2024-02-09 DIAGNOSIS — Z79.01 LONG TERM (CURRENT) USE OF ANTICOAGULANTS: ICD-10-CM

## 2024-02-09 LAB
ALBUMIN SERPL-MCNC: 3.7 G/DL (ref 3.5–5.2)
ALBUMIN/GLOB SERPL: 1.1 G/DL
ALP SERPL-CCNC: 84 U/L (ref 39–117)
ALT SERPL W P-5'-P-CCNC: 53 U/L (ref 1–41)
ANION GAP SERPL CALCULATED.3IONS-SCNC: 11 MMOL/L (ref 5–15)
AST SERPL-CCNC: 35 U/L (ref 1–40)
BASOPHILS # BLD AUTO: 0.06 10*3/MM3 (ref 0–0.2)
BASOPHILS NFR BLD AUTO: 0.7 % (ref 0–1.5)
BILIRUB SERPL-MCNC: 0.5 MG/DL (ref 0–1.2)
BUN SERPL-MCNC: 8 MG/DL (ref 6–20)
BUN/CREAT SERPL: 8.8 (ref 7–25)
CALCIUM SPEC-SCNC: 8.9 MG/DL (ref 8.6–10.5)
CHLORIDE SERPL-SCNC: 106 MMOL/L (ref 98–107)
CHOLEST SERPL-MCNC: 195 MG/DL (ref 0–200)
CO2 SERPL-SCNC: 24 MMOL/L (ref 22–29)
CREAT SERPL-MCNC: 0.91 MG/DL (ref 0.76–1.27)
DEPRECATED RDW RBC AUTO: 47.5 FL (ref 37–54)
EGFRCR SERPLBLD CKD-EPI 2021: 108.6 ML/MIN/1.73
EOSINOPHIL # BLD AUTO: 0.08 10*3/MM3 (ref 0–0.4)
EOSINOPHIL NFR BLD AUTO: 1 % (ref 0.3–6.2)
ERYTHROCYTE [DISTWIDTH] IN BLOOD BY AUTOMATED COUNT: 12.6 % (ref 12.3–15.4)
GLOBULIN UR ELPH-MCNC: 3.4 GM/DL
GLUCOSE SERPL-MCNC: 93 MG/DL (ref 65–99)
HCT VFR BLD AUTO: 51.5 % (ref 37.5–51)
HDLC SERPL-MCNC: 52 MG/DL (ref 40–60)
HGB BLD-MCNC: 17.9 G/DL (ref 13–17.7)
IMM GRANULOCYTES # BLD AUTO: 0.05 10*3/MM3 (ref 0–0.05)
IMM GRANULOCYTES NFR BLD AUTO: 0.6 % (ref 0–0.5)
LDLC SERPL CALC-MCNC: 126 MG/DL (ref 0–100)
LDLC/HDLC SERPL: 2.39 {RATIO}
LYMPHOCYTES # BLD AUTO: 2.81 10*3/MM3 (ref 0.7–3.1)
LYMPHOCYTES NFR BLD AUTO: 34.4 % (ref 19.6–45.3)
MCH RBC QN AUTO: 35.9 PG (ref 26.6–33)
MCHC RBC AUTO-ENTMCNC: 34.8 G/DL (ref 31.5–35.7)
MCV RBC AUTO: 103.4 FL (ref 79–97)
MONOCYTES # BLD AUTO: 0.85 10*3/MM3 (ref 0.1–0.9)
MONOCYTES NFR BLD AUTO: 10.4 % (ref 5–12)
NEUTROPHILS NFR BLD AUTO: 4.31 10*3/MM3 (ref 1.7–7)
NEUTROPHILS NFR BLD AUTO: 52.9 % (ref 42.7–76)
NRBC BLD AUTO-RTO: 0.1 /100 WBC (ref 0–0.2)
NT-PROBNP SERPL-MCNC: 85.3 PG/ML (ref 0–450)
NT-PROBNP SERPL-MCNC: 90.9 PG/ML (ref 0–450)
PLATELET # BLD AUTO: 172 10*3/MM3 (ref 140–450)
PMV BLD AUTO: 10.6 FL (ref 6–12)
POTASSIUM SERPL-SCNC: 4.4 MMOL/L (ref 3.5–5.2)
PROT SERPL-MCNC: 7.1 G/DL (ref 6–8.5)
RBC # BLD AUTO: 4.98 10*6/MM3 (ref 4.14–5.8)
SODIUM SERPL-SCNC: 141 MMOL/L (ref 136–145)
TRIGL SERPL-MCNC: 94 MG/DL (ref 0–150)
VLDLC SERPL-MCNC: 17 MG/DL (ref 5–40)
WBC NRBC COR # BLD AUTO: 8.16 10*3/MM3 (ref 3.4–10.8)

## 2024-02-09 PROCEDURE — 36415 COLL VENOUS BLD VENIPUNCTURE: CPT

## 2024-02-09 PROCEDURE — 83880 ASSAY OF NATRIURETIC PEPTIDE: CPT

## 2024-02-09 PROCEDURE — 80061 LIPID PANEL: CPT

## 2024-02-09 PROCEDURE — 80053 COMPREHEN METABOLIC PANEL: CPT

## 2024-02-09 PROCEDURE — 85025 COMPLETE CBC W/AUTO DIFF WBC: CPT

## 2024-02-09 RX ORDER — SACUBITRIL AND VALSARTAN 97; 103 MG/1; MG/1
1 TABLET, FILM COATED ORAL 2 TIMES DAILY
Qty: 60 TABLET | Refills: 3 | Status: SHIPPED | OUTPATIENT
Start: 2024-02-09

## 2024-04-18 RX ORDER — EMPAGLIFLOZIN 10 MG/1
10 TABLET, FILM COATED ORAL DAILY
Qty: 90 TABLET | Refills: 3 | Status: SHIPPED | OUTPATIENT
Start: 2024-04-18

## 2024-04-18 RX ORDER — BISOPROLOL FUMARATE AND HYDROCHLOROTHIAZIDE 10; 6.25 MG/1; MG/1
1 TABLET ORAL DAILY
Qty: 90 TABLET | Refills: 3 | Status: SHIPPED | OUTPATIENT
Start: 2024-04-18

## 2024-04-18 NOTE — TELEPHONE ENCOUNTER
Rx Refill Note  Requested Prescriptions     Pending Prescriptions Disp Refills    bisoprolol-hydrochlorothiazide (ZIAC) 10-6.25 MG per tablet [Pharmacy Med Name: BISOPROLOL/HCTZ 10MG/6.25MG TABS] 90 tablet 3     Sig: TAKE 1 TABLET BY MOUTH DAILY    Jardiance 10 MG tablet tablet [Pharmacy Med Name: JARDIANCE 10MG TABLETS] 90 tablet 3     Sig: TAKE 1 TABLET BY MOUTH DAILY      Last office visit with prescribing clinician: 8/7/2023   Last telemedicine visit with prescribing clinician: Visit date not found   Next office visit with prescribing clinician: 8/13/2024                         Would you like a call back once the refill request has been completed: [] Yes [] No    If the office needs to give you a call back, can they leave a voicemail: [] Yes [] No    Elsy Shine MA  04/18/24, 11:18 EDT

## 2024-08-11 NOTE — PROGRESS NOTES
Subjective:     Encounter Date:08/13/2024      Patient ID: Marquise Short is a 44 y.o. male.    Chief Complaint and history of present illness:       Follow-up for CHF, cardiomyopathy, A-fib, anticoagulation     History of present illness:     Mr.Scott DALJIT Short has PMH of     # A.Fib, s/p AZALIA guided cardioversion 06/27/2018, 1/7/2019  # cardiomyopathy possibly tachycardia induced cardiomyopathy EF of 30% by AZALIA 06/2018  # abnormal Lexiscan with fixed inferior defect 06/27/2018, LVEF of 20%, trans esophageal echo  3/21/2019 EF of 40%  #  hypertension,   # morbid obesity  #  cigarette smoker  # EtOH use      Here for follow-up.  Patient denies any chest pain.  Has dyspnea on exertion and fatigue.  Has trace edema at the end of the day and gets better when he sleeps and wakes up.     Patient's arterial blood pressures is 126/87, heart rate 75, O2 sat of 93% on room air.   BMI is over 50. Patient is a current smoker.         Presented to the ED on 2/18/2023 with shortness of breath and inability to stay awake.  He denied any chest pain or palpitations.   Patient has ERIBERTO and does not wear CPAP he also continues to drink alcohol daily to help him sleep.      Labs from 2/19/2023 reveal elevated HS troponin is very minimally elevated 25->31->30. Pro bnp 5000. 2/19/2023 ALT 94 AST 80 .2/20/2023: glucose 104.  Labs from 2/9/2024 reveal proBNP normal at 85.  CBC with a hemoglobin of 17.9.  Lipid profile with cholesterol 195, triglycerides 94, HDL 52, .  CMP with a ALT of 53            ASSESSMENT:        Permanent atrial fib, flutter   on long-term anticoagulation   Hypertension  Chronic systolic and diastolic congestive heart failure due to atrial fibrillation and tachycardia induced cardiomyopathy, HCVD  chest pain,  equivocal stress test with fixed inferior defect 06/29/2018  snores, obesity  ERIBERTO not on CPAP therapy   Hypertension/ HCVD  Elevated ALT.  Obesity with BMI over 50        PLAN:     Reviewed EKG results  with patient.  Continue medical management with bisoprolol hydrochlorothiazide, Jardiance, Xarelto, Entresto, Aldactone as tolerated.  Reviewed BMI over 50 counseled on weight loss diet and exercise.  Follow-up with PMD for elevated ALT and elevated hemoglobin and hematocrit.  Counseled on importance of compliance with CPAP.     [id number] SCORE   [id number] Score: 2 (8/13/2024  8:38 AM)     Patient has elevated MNT5YH5-BEWh Score: 2 (2/19/2023  9:00 AM)  Will benefit from long-term anticoagulation to prevent thromboembolic events from A-fib.  Will continue Xarelto as tolerated.             Echocardiogram 2/19/2023  Left ventricular ejection fraction appears to be 26 - 30%.    The left ventricular cavity is dilated.    Left ventricular diastolic dysfunction is noted.    The right atrial cavity is mildly  dilated.     Repeat echo 8/7/2023 reveals EF of 40%              ECG 12 Lead     Date/Time: 8/7/2023 10:33 AM  Performed by: Davon Kohler MD  Authorized by: Davon Kohler MD   Comparison: compared with previous ECG from 2/19/2023  Comparison to previous ECG: EKG done today reviewed/interpreted by me reveals A-fib flutter at the rate of 63 bpm with nonspecific ST-T changes, no new change compared EKG from 2/19/2023.                 ECG 12 Lead    Date/Time: 8/13/2024 10:18 AM  Performed by: Davon Kohler MD    Authorized by: Davon Kohler MD  Comparison: compared with previous ECG from 8/7/2023  Comparison to previous ECG: EKG done today reviewed/interpreted by me reveals A-fib with a rate of 72 bpm, no significant change compared EKG from 8/7/2023          Copied text in this portion of the note has been reviewed and is accurate as of 8/13/2024  The following portions of the patient's history were reviewed and updated as appropriate: allergies, current medications, past family history, past medical history, past social history, past surgical history and problem  list.    Assessment:         MDM       Diagnosis Plan   1. Chronic HFrEF (heart failure with reduced ejection fraction)  Comprehensive Metabolic Panel    Lipid Panel    TSH      2. Permanent atrial fibrillation  Comprehensive Metabolic Panel    Lipid Panel    TSH      3. Long term current use of anticoagulant therapy  Comprehensive Metabolic Panel    Lipid Panel    TSH      4. Essential hypertension  Comprehensive Metabolic Panel    Lipid Panel    TSH      5. NICM (nonischemic cardiomyopathy)  Comprehensive Metabolic Panel    Lipid Panel    TSH      6. Elevated ALT measurement        7. Morbid obesity with BMI of 50.0-59.9, adult               Plan:               Past Medical History:  Past Medical History:   Diagnosis Date    A-fib     Cardiomyopathy     Hypertension      Past Surgical History:  Past Surgical History:   Procedure Laterality Date    CARDIOVERSION        Allergies:  No Known Allergies  Home Meds:  Current Meds:     Current Outpatient Medications:     bisoprolol-hydrochlorothiazide (ZIAC) 10-6.25 MG per tablet, TAKE 1 TABLET BY MOUTH DAILY, Disp: 90 tablet, Rfl: 3    empagliflozin (Jardiance) 10 MG tablet tablet, TAKE 1 TABLET BY MOUTH DAILY, Disp: 90 tablet, Rfl: 0    rivaroxaban (Xarelto) 20 MG tablet, TAKE 1 TABLET BY MOUTH DAILY, Disp: 90 tablet, Rfl: 3    sacubitril-valsartan (Entresto)  MG tablet, Take 1 tablet by mouth 2 (Two) Times a Day., Disp: 60 tablet, Rfl: 3    spironolactone (ALDACTONE) 50 MG tablet, TAKE 1 TABLET BY MOUTH DAILY, Disp: 90 tablet, Rfl: 0  Social History:   Social History     Tobacco Use    Smoking status: Every Day     Types: Cigarettes     Passive exposure: Current    Smokeless tobacco: Current     Types: Chew   Substance Use Topics    Alcohol use: Never      Family History:  Family History   Problem Relation Age of Onset    Heart attack Paternal Grandfather               ROS  All other systems are negative         Objective:     Physical Exam  /87   Pulse  "75   Ht 185.4 cm (73\")   Wt (!) 184 kg (405 lb)   SpO2 93%   BMI 53.43 kg/m²   General:  Appears in no acute distress  Eyes: Sclera is anicteric,  conjunctiva is clear   HEENT:  No JVD.  No carotid bruits  Respiratory: Respirations regular and unlabored at rest.  Clear to auscultation  Cardiovascular: S1,S2, irregularly irregular rate and rhythm  Extremities: No digital clubbing or cyanosis, no edema  Skin: Color pink. Skin warm and dry to touch. No rashes  No xanthoma  Neuro: Alert and awake.    Lab Reviewed:         Davon Kohler MD  8/13/2024 10:18 EDT      EMR Dragon/Transcription:   \"Dictated utilizing Dragon dictation\".        "

## 2024-08-12 RX ORDER — SPIRONOLACTONE 50 MG/1
50 TABLET, FILM COATED ORAL DAILY
Qty: 90 TABLET | Refills: 0 | Status: SHIPPED | OUTPATIENT
Start: 2024-08-12 | End: 2024-08-19

## 2024-08-12 RX ORDER — EMPAGLIFLOZIN 10 MG/1
10 TABLET, FILM COATED ORAL DAILY
Qty: 90 TABLET | Refills: 0 | Status: SHIPPED | OUTPATIENT
Start: 2024-08-12

## 2024-08-12 NOTE — TELEPHONE ENCOUNTER
Rx Refill Note  Requested Prescriptions     Pending Prescriptions Disp Refills    spironolactone (ALDACTONE) 50 MG tablet [Pharmacy Med Name: SPIRONOLACTONE 50MG TABLETS] 90 tablet 3     Sig: TAKE 1 TABLET BY MOUTH DAILY    Jardiance 10 MG tablet tablet [Pharmacy Med Name: JARDIANCE 10MG TABLETS] 90 tablet 3     Sig: TAKE 1 TABLET BY MOUTH DAILY      Last office visit with prescribing clinician: 8/7/2023   Last telemedicine visit with prescribing clinician: Visit date not found   Next office visit with prescribing clinician: 8/13/2024                         Would you like a call back once the refill request has been completed: [] Yes [] No    If the office needs to give you a call back, can they leave a voicemail: [] Yes [] No    Jaleesa Iqbal MA  08/12/24, 11:37 EDT

## 2024-08-13 ENCOUNTER — OFFICE VISIT (OUTPATIENT)
Dept: CARDIOLOGY | Facility: CLINIC | Age: 44
End: 2024-08-13
Payer: COMMERCIAL

## 2024-08-13 VITALS
BODY MASS INDEX: 41.75 KG/M2 | SYSTOLIC BLOOD PRESSURE: 126 MMHG | HEART RATE: 75 BPM | HEIGHT: 73 IN | DIASTOLIC BLOOD PRESSURE: 87 MMHG | OXYGEN SATURATION: 93 % | WEIGHT: 315 LBS

## 2024-08-13 DIAGNOSIS — I50.22 CHRONIC HFREF (HEART FAILURE WITH REDUCED EJECTION FRACTION): Primary | ICD-10-CM

## 2024-08-13 DIAGNOSIS — R74.01 ELEVATED ALT MEASUREMENT: ICD-10-CM

## 2024-08-13 DIAGNOSIS — E66.01 MORBID OBESITY WITH BMI OF 50.0-59.9, ADULT: ICD-10-CM

## 2024-08-13 DIAGNOSIS — I42.8 NICM (NONISCHEMIC CARDIOMYOPATHY): ICD-10-CM

## 2024-08-13 DIAGNOSIS — Z79.01 LONG TERM CURRENT USE OF ANTICOAGULANT THERAPY: ICD-10-CM

## 2024-08-13 DIAGNOSIS — I48.21 PERMANENT ATRIAL FIBRILLATION: ICD-10-CM

## 2024-08-13 DIAGNOSIS — I10 ESSENTIAL HYPERTENSION: ICD-10-CM

## 2024-08-13 PROCEDURE — 99214 OFFICE O/P EST MOD 30 MIN: CPT | Performed by: INTERNAL MEDICINE

## 2024-08-13 PROCEDURE — 93000 ELECTROCARDIOGRAM COMPLETE: CPT | Performed by: INTERNAL MEDICINE

## 2024-08-19 RX ORDER — SPIRONOLACTONE 50 MG/1
50 TABLET, FILM COATED ORAL DAILY
Qty: 90 TABLET | Refills: 3 | Status: SHIPPED | OUTPATIENT
Start: 2024-08-19

## 2024-08-19 NOTE — TELEPHONE ENCOUNTER
Rx Refill Note  Requested Prescriptions     Pending Prescriptions Disp Refills    spironolactone (ALDACTONE) 50 MG tablet [Pharmacy Med Name: SPIRONOLACTONE 50MG TABLETS] 90 tablet 0     Sig: TAKE 1 TABLET BY MOUTH DAILY      Last office visit with prescribing clinician: 8/13/2024   Last telemedicine visit with prescribing clinician: Visit date not found   Next office visit with prescribing clinician: 2/13/2025                         Would you like a call back once the refill request has been completed: [] Yes [] No    If the office needs to give you a call back, can they leave a voicemail: [] Yes [] No    Jaleesa Iqbal MA  08/19/24, 11:01 EDT

## 2024-11-08 RX ORDER — RIVAROXABAN 20 MG/1
TABLET, FILM COATED ORAL
Qty: 90 TABLET | Refills: 2 | Status: SHIPPED | OUTPATIENT
Start: 2024-11-08

## 2024-11-08 NOTE — TELEPHONE ENCOUNTER
Rx Refill Note  Requested Prescriptions     Pending Prescriptions Disp Refills    Xarelto 20 MG tablet [Pharmacy Med Name: XARELTO 20MG TABLETS] 90 tablet 3     Sig: TAKE 1 TABLET BY MOUTH DAILY      Last office visit with prescribing clinician: 8/13/2024   Last telemedicine visit with prescribing clinician: Visit date not found   Next office visit with prescribing clinician: 2/13/2025                         Would you like a call back once the refill request has been completed: [] Yes [] No    If the office needs to give you a call back, can they leave a voicemail: [] Yes [] No    Jaleesa Iqbal MA  11/08/24, 09:03 EST

## 2025-02-12 ENCOUNTER — LAB (OUTPATIENT)
Dept: LAB | Facility: HOSPITAL | Age: 45
End: 2025-02-12
Payer: COMMERCIAL

## 2025-02-12 ENCOUNTER — OFFICE VISIT (OUTPATIENT)
Dept: CARDIOLOGY | Facility: CLINIC | Age: 45
End: 2025-02-12
Payer: COMMERCIAL

## 2025-02-12 VITALS
HEIGHT: 73 IN | SYSTOLIC BLOOD PRESSURE: 93 MMHG | DIASTOLIC BLOOD PRESSURE: 73 MMHG | BODY MASS INDEX: 41.75 KG/M2 | WEIGHT: 315 LBS | OXYGEN SATURATION: 93 % | HEART RATE: 80 BPM

## 2025-02-12 DIAGNOSIS — R74.01 ELEVATED ALT MEASUREMENT: ICD-10-CM

## 2025-02-12 DIAGNOSIS — I42.8 NICM (NONISCHEMIC CARDIOMYOPATHY): ICD-10-CM

## 2025-02-12 DIAGNOSIS — I48.21 PERMANENT ATRIAL FIBRILLATION: ICD-10-CM

## 2025-02-12 DIAGNOSIS — I50.22 CHRONIC HFREF (HEART FAILURE WITH REDUCED EJECTION FRACTION): ICD-10-CM

## 2025-02-12 DIAGNOSIS — Z79.01 LONG TERM CURRENT USE OF ANTICOAGULANT THERAPY: ICD-10-CM

## 2025-02-12 DIAGNOSIS — I10 ESSENTIAL HYPERTENSION: ICD-10-CM

## 2025-02-12 DIAGNOSIS — E66.01 MORBID OBESITY WITH BMI OF 50.0-59.9, ADULT: ICD-10-CM

## 2025-02-12 DIAGNOSIS — I50.22 CHRONIC HFREF (HEART FAILURE WITH REDUCED EJECTION FRACTION): Primary | ICD-10-CM

## 2025-02-12 LAB
ALBUMIN SERPL-MCNC: 4 G/DL (ref 3.5–5.2)
ALBUMIN/GLOB SERPL: 1.2 G/DL
ALP SERPL-CCNC: 67 U/L (ref 39–117)
ALT SERPL W P-5'-P-CCNC: 54 U/L (ref 1–41)
ANION GAP SERPL CALCULATED.3IONS-SCNC: 10.2 MMOL/L (ref 5–15)
AST SERPL-CCNC: 36 U/L (ref 1–40)
BILIRUB SERPL-MCNC: 0.4 MG/DL (ref 0–1.2)
BUN SERPL-MCNC: 8 MG/DL (ref 6–20)
BUN/CREAT SERPL: 8.2 (ref 7–25)
CALCIUM SPEC-SCNC: 9.3 MG/DL (ref 8.6–10.5)
CHLORIDE SERPL-SCNC: 103 MMOL/L (ref 98–107)
CHOLEST SERPL-MCNC: 179 MG/DL (ref 0–200)
CO2 SERPL-SCNC: 26.8 MMOL/L (ref 22–29)
CREAT SERPL-MCNC: 0.98 MG/DL (ref 0.76–1.27)
EGFRCR SERPLBLD CKD-EPI 2021: 98.7 ML/MIN/1.73
GLOBULIN UR ELPH-MCNC: 3.3 GM/DL
GLUCOSE SERPL-MCNC: 96 MG/DL (ref 65–99)
HDLC SERPL-MCNC: 46 MG/DL (ref 40–60)
LDLC SERPL CALC-MCNC: 113 MG/DL (ref 0–100)
LDLC/HDLC SERPL: 2.41 {RATIO}
POTASSIUM SERPL-SCNC: 4.7 MMOL/L (ref 3.5–5.2)
PROT SERPL-MCNC: 7.3 G/DL (ref 6–8.5)
SODIUM SERPL-SCNC: 140 MMOL/L (ref 136–145)
TRIGL SERPL-MCNC: 110 MG/DL (ref 0–150)
TSH SERPL DL<=0.05 MIU/L-ACNC: 1.42 UIU/ML (ref 0.27–4.2)
VLDLC SERPL-MCNC: 20 MG/DL (ref 5–40)

## 2025-02-12 PROCEDURE — 84443 ASSAY THYROID STIM HORMONE: CPT

## 2025-02-12 PROCEDURE — 36415 COLL VENOUS BLD VENIPUNCTURE: CPT

## 2025-02-12 PROCEDURE — 80061 LIPID PANEL: CPT

## 2025-02-12 PROCEDURE — 80053 COMPREHEN METABOLIC PANEL: CPT

## 2025-02-12 NOTE — PROGRESS NOTES
Subjective:     Encounter Date:02/12/2025      Patient ID: Marquise Short is a 44 y.o. male.    Chief Complaint and history of present illness:     Follow-up for CHF, cardiomyopathy, A-fib, anticoagulation     History of present illness:     Mr.Scott DALJIT Short has PMH of     # A.Fib, s/p AZALIA guided cardioversion 06/27/2018, 1/7/2019  # cardiomyopathy possibly tachycardia induced cardiomyopathy EF of 30% by AZALIA 06/2018  # abnormal Lexiscan with fixed inferior defect 06/27/2018, LVEF of 20%, trans esophageal echo  3/21/2019 EF of 40%  #  hypertension,   # morbid obesity  #  cigarette smoker  # EtOH use      Here for follow-up.  Patient denies any chest pain.  Has occasional shortness of breath and dyspnea on exertion.  Patient says he gets lightheaded if he walks for a long duration.     Patient's arterial blood pressures is 93/73, heart rate 80 bpm, O2 sat of 93% on room air.  BMI is over 54.        Review of records reveal that patient presented to the ED on 2/18/2023 with shortness of breath and inability to stay awake.  He denied any chest pain or palpitations.   Patient has ERIBERTO and does not wear CPAP he also continues to drink alcohol daily to help him sleep.      Labs from 2/19/2023 reveal elevated HS troponin is very minimally elevated 25->31->30. Pro bnp 5000. 2/19/2023 ALT 94 AST 80 .2/20/2023: glucose 104.  Labs from 2/9/2024 reveal proBNP normal at 85.  CBC with a hemoglobin of 17.9.  Lipid profile with cholesterol 195, triglycerides 94, HDL 52, .  CMP with a ALT of 53.  Labs from 2/12/2025 reveal normal TSH at 1.41 CMP with ALT of 54.  Repeat profile is pending.            ASSESSMENT:        Permanent atrial fib, flutter   on long-term anticoagulation   Hypertension  Chronic systolic and diastolic congestive heart failure due to atrial fibrillation and tachycardia induced cardiomyopathy, HCVD  chest pain,  equivocal stress test with fixed inferior defect 06/29/2018  snores, obesity  ERIBERTO not on  CPAP therapy   Hypertension/ HCVD  Elevated ALT.  Obesity with BMI over 50        PLAN:    Reviewed EKG results with patient.  Continue medical management with bisoprolol hydrochlorothiazide, Entresto, Aldactone, Jardiance, Xarelto to help with CHF, hypertension, A-fib, as tolerated.  Patient has elevated [id number]-CWHp score of 2, will benefit from long-term anticoagulation will continue Xarelto as tolerated.  Reviewed BMI over 50 counseled on weight loss diet and exercise.  Patient wants to go on [facility].  Wants endocrinology referral.  Will send him to endocrinology for the same.  Counseled on importance of CPAP and compliance to CPAP..     [id number] SCORE   [id number] Score: 2 (2/12/2025  1:19 PM)     [id number] Score: 2 (8/13/2024  8:38 AM)     Will follow-up in heart failure clinic.              Echocardiogram 2/19/2023  Left ventricular ejection fraction appears to be 26 - 30%.    The left ventricular cavity is dilated.    Left ventricular diastolic dysfunction is noted.    The right atrial cavity is mildly  dilated.     Repeat echo 8/7/2023 reveals EF of 40%            ECG 12 Lead    Date/Time: 2/12/2025 1:21 PM  Performed by: Davon Kohler MD    Authorized by: Davon Kohler MD  Comparison: compared with previous ECG from 8/13/2024  Comparison to previous ECG: EKG done today reviewed/interpreted by me reveals A-fib with a rate of 69 bpm, no significant change compared to EKG from 8/13/2024          ECHOCARDIOGRAM:  Results for orders placed during the hospital encounter of 08/07/23    Adult Transthoracic Echo Complete W/ Cont if Necessary Per Protocol    Interpretation Summary  Conclusion    Lumason contrast was given to better evaluate LV function  Normal LV size and possible apical hypokinesis, estimated LV ejection fraction around 45 to 50%  Normal RV size  Normal atrial size  Pulmonic valve is not well visualized.  Aortic valve, mitral valve, tricuspid valve appears  structurally normal, no significant regurgitation seen.  No pericardial effusion seen.  Proximal aorta appears normal in size.      STRESS TEST          HEART CATHETERIZATION  No results found for this or any previous visit.      Copied text in this portion of the note has been reviewed and is accurate as of 2/12/2025  The following portions of the patient's history were reviewed and updated as appropriate: allergies, current medications, past family history, past medical history, past social history, past surgical history and problem list.    Assessment:         [facility]       Diagnosis Plan   1. Chronic HFrEF (heart failure with reduced ejection fraction)  ECG 12 Lead      2. Permanent atrial fibrillation  ECG 12 Lead      3. Long term current use of anticoagulant therapy  ECG 12 Lead      4. Essential hypertension  ECG 12 Lead      5. NICM (nonischemic cardiomyopathy)  ECG 12 Lead      6. Elevated ALT measurement  ECG 12 Lead      7. Morbid obesity with BMI of 50.0-59.9, adult  ECG 12 Lead    Ambulatory Referral to Endocrinology             Plan:               Past Medical History:  Past Medical History:   Diagnosis Date    A-fib     Cardiomyopathy     Hypertension      Past Surgical History:  Past Surgical History:   Procedure Laterality Date    CARDIOVERSION        Allergies:  No Known Allergies  Home Meds:  Current Meds:     Current Outpatient Medications:     bisoprolol-hydrochlorothiazide (ZIAC) 10-6.25 MG per tablet, TAKE 1 TABLET BY MOUTH DAILY, Disp: 90 tablet, Rfl: 3    empagliflozin (Jardiance) 10 MG tablet tablet, TAKE 1 TABLET BY MOUTH DAILY, Disp: 90 tablet, Rfl: 0    rivaroxaban (Xarelto) 20 MG tablet, TAKE 1 TABLET BY MOUTH DAILY, Disp: 90 tablet, Rfl: 2    sacubitril-valsartan (Entresto)  MG tablet, Take 1 tablet by mouth 2 (Two) Times a Day., Disp: 60 tablet, Rfl: 3    spironolactone (ALDACTONE) 50 MG tablet, TAKE 1 TABLET BY MOUTH DAILY, Disp: 90 tablet, Rfl: 3  Social History:   Social History  "    Tobacco Use    Smoking status: Every Day     Types: Cigarettes     Passive exposure: Current    Smokeless tobacco: Current     Types: Chew   Substance Use Topics    Alcohol use: Never      Family History:  Family History   Problem Relation Age of Onset    Heart attack Paternal Grandfather               Review of Systems   Constitutional: Negative for malaise/fatigue.   Cardiovascular:  Negative for chest pain, leg swelling and palpitations.   Respiratory:  Positive for shortness of breath.    Skin:  Negative for rash.   Neurological:  Positive for light-headedness. Negative for dizziness and numbness.     All other systems are negative         Objective:     Physical Exam  BP 93/73   Pulse 80   Ht 185.4 cm (73\")   Wt (!) 188 kg (415 lb)   SpO2 93%   BMI 54.75 kg/m²   General:  Appears in no acute distress  Eyes: Sclera is anicteric,  conjunctiva is clear   HEENT:  No JVD.  No carotid bruits  Respiratory: Respirations regular and unlabored at rest.  Clear to auscultation  Cardiovascular: S1,S2 Regular rate and rhythm. .   Extremities: No digital clubbing or cyanosis, no edema  Skin: Color pink. Skin warm and dry to touch. No rashes  No xanthoma  Neuro: Alert and awake.    Lab Reviewed:         Davon Kohler MD  2/12/2025 13:26 EST      EMR Dragon/Transcription:   \"Dictated utilizing Dragon dictation\".        "

## 2025-02-19 RX ORDER — SACUBITRIL AND VALSARTAN 97; 103 MG/1; MG/1
1 TABLET, FILM COATED ORAL 2 TIMES DAILY
Qty: 180 TABLET | Refills: 3 | Status: SHIPPED | OUTPATIENT
Start: 2025-02-19

## 2025-02-19 NOTE — TELEPHONE ENCOUNTER
Rx Refill Note  Requested Prescriptions     Pending Prescriptions Disp Refills    sacubitril-valsartan (Entresto)  MG tablet [Pharmacy Med Name: ENTRESTO 97-103MG TABLETS] 180 tablet 3     Sig: TAKE 1 TABLET BY MOUTH TWICE DAILY      Last office visit with prescribing clinician: 2/9/2024   Last telemedicine visit with prescribing clinician: Visit date not found   Next office visit with prescribing clinician: 8/12/2025                         Would you like a call back once the refill request has been completed: [] Yes [] No    If the office needs to give you a call back, can they leave a voicemail: [] Yes [] No    Elsy Shine MA  02/19/25, 14:46 EST

## 2025-05-15 RX ORDER — EMPAGLIFLOZIN 10 MG/1
10 TABLET, FILM COATED ORAL DAILY
Qty: 90 TABLET | Refills: 2 | Status: SHIPPED | OUTPATIENT
Start: 2025-05-15

## 2025-05-15 RX ORDER — BISOPROLOL FUMARATE AND HYDROCHLOROTHIAZIDE 10; 6.25 MG/1; MG/1
1 TABLET ORAL DAILY
Qty: 90 TABLET | Refills: 2 | Status: SHIPPED | OUTPATIENT
Start: 2025-05-15

## 2025-05-15 NOTE — TELEPHONE ENCOUNTER
Rx Refill Note  Requested Prescriptions     Pending Prescriptions Disp Refills    bisoprolol-hydrochlorothiazide (ZIAC) 10-6.25 MG per tablet [Pharmacy Med Name: BISOPROLOL/HCTZ 10MG/6.25MG TABS] 90 tablet 3     Sig: TAKE 1 TABLET BY MOUTH DAILY    Jardiance 10 MG tablet tablet [Pharmacy Med Name: JARDIANCE 10MG TABLETS] 90 tablet 0     Sig: TAKE 1 TABLET BY MOUTH DAILY      Last office visit with prescribing clinician: 2/12/2025   Last telemedicine visit with prescribing clinician: Visit date not found   Next office visit with prescribing clinician: Visit date not found                         Would you like a call back once the refill request has been completed: [] Yes [] No    If the office needs to give you a call back, can they leave a voicemail: [] Yes [] No    Jaleesa Iqbal MA  05/15/25, 09:57 EDT

## 2025-06-11 ENCOUNTER — SPECIALTY PHARMACY (OUTPATIENT)
Dept: ENDOCRINOLOGY | Facility: CLINIC | Age: 45
End: 2025-06-11
Payer: COMMERCIAL

## 2025-06-11 ENCOUNTER — OFFICE VISIT (OUTPATIENT)
Dept: ENDOCRINOLOGY | Facility: CLINIC | Age: 45
End: 2025-06-11
Payer: COMMERCIAL

## 2025-06-11 VITALS
SYSTOLIC BLOOD PRESSURE: 140 MMHG | WEIGHT: 315 LBS | BODY MASS INDEX: 41.75 KG/M2 | HEART RATE: 84 BPM | OXYGEN SATURATION: 100 % | HEIGHT: 73 IN | DIASTOLIC BLOOD PRESSURE: 90 MMHG

## 2025-06-11 DIAGNOSIS — E66.01 MORBID OBESITY: Primary | ICD-10-CM

## 2025-06-11 DIAGNOSIS — I48.91 ATRIAL FIBRILLATION, UNSPECIFIED TYPE: ICD-10-CM

## 2025-06-11 DIAGNOSIS — I25.10 CORONARY ARTERY DISEASE INVOLVING NATIVE CORONARY ARTERY OF NATIVE HEART WITHOUT ANGINA PECTORIS: ICD-10-CM

## 2025-06-11 DIAGNOSIS — I50.20 HFREF (HEART FAILURE WITH REDUCED EJECTION FRACTION): ICD-10-CM

## 2025-06-11 PROCEDURE — 99204 OFFICE O/P NEW MOD 45 MIN: CPT | Performed by: INTERNAL MEDICINE

## 2025-06-11 RX ORDER — SEMAGLUTIDE 0.5 MG/.5ML
0.5 INJECTION, SOLUTION SUBCUTANEOUS WEEKLY
Qty: 2 ML | Refills: 5 | Status: SHIPPED | OUTPATIENT
Start: 2025-06-11

## 2025-06-11 RX ORDER — SEMAGLUTIDE 0.25 MG/.5ML
0.25 INJECTION, SOLUTION SUBCUTANEOUS WEEKLY
Qty: 2 ML | Refills: 0 | Status: SHIPPED | OUTPATIENT
Start: 2025-06-11

## 2025-06-11 NOTE — PROGRESS NOTES
-----------------------------------------------------------------  ENDOCRINE CLINIC NOTE  -----------------------------------------------------------------        PATIENT NAME: Marquise Short  PATIENT : 1980 AGE: 44 y.o.  MRN NUMBER: [id number]  PRIMARY CARE: Provider, No Known    ==========================================================================    CHIEF COMPLAINT: Morbid Obesity  DATE OF SERVICE: 25    ==========================================================================    HPI / SUBJECTIVE    44 y.o. male is seen in the clinic today for morbid obesity.  Patient have a history significant for hypertension, atrial fibrillation requiring AZALIA guided cardioversion, heart failure with reduced ejection fraction and coronary artery disease with fixed inferior wall defect.  Patient was following up with cardiology and during the visit patient had a discussion with cardiology about possibly using GLP-1 receptor agonist therapy for weight loss and therefore patient was referred to endocrinology clinic.  Patient have been suffering from obesity since childhood, but got worse since 2018.  Still smoking 1 PPD. Drinks around 6 / day.  Denied any hx of pancreatitis.  Hx of acid reflux and is not on meds.  Currently consuming 2 meals a day, breakfast and supper. 2 cups of coffee a day, orange juice one cup a day, no energy drinks, no flavored water, no sodas, 2 snacks a day.  No personal or family hx of thyroid cancer.    ==========================================================================                                                PAST MEDICAL HISTORY    Past Medical History:   Diagnosis Date    A-fib     Cardiomyopathy     Hypertension        ==========================================================================    PAST SURGICAL HISTORY    Past Surgical History:   Procedure Laterality Date    CARDIOVERSION          ==========================================================================    FAMILY HISTORY    Family History   Problem Relation Age of Onset    Heart attack Paternal Grandfather        ==========================================================================    SOCIAL HISTORY    Social History     Socioeconomic History    Marital status: Single   Tobacco Use    Smoking status: Every Day     Types: Cigarettes     Passive exposure: Current    Smokeless tobacco: Current     Types: Chew   Vaping Use    Vaping status: Never Used   Substance and Sexual Activity    Alcohol use: Never    Drug use: Never    Sexual activity: Never       ==========================================================================    MEDICATIONS      Current Outpatient Medications:     bisoprolol-hydrochlorothiazide (ZIAC) 10-6.25 MG per tablet, TAKE 1 TABLET BY MOUTH DAILY, Disp: 90 tablet, Rfl: 2    empagliflozin (Jardiance) 10 MG tablet tablet, TAKE 1 TABLET BY MOUTH DAILY, Disp: 90 tablet, Rfl: 2    rivaroxaban (Xarelto) 20 MG tablet, TAKE 1 TABLET BY MOUTH DAILY, Disp: 90 tablet, Rfl: 2    sacubitril-valsartan (Entresto)  MG tablet, TAKE 1 TABLET BY MOUTH TWICE DAILY, Disp: 180 tablet, Rfl: 3    spironolactone (ALDACTONE) 50 MG tablet, TAKE 1 TABLET BY MOUTH DAILY, Disp: 90 tablet, Rfl: 3    ==========================================================================    ALLERGIES    No Known Allergies    ==========================================================================    OBJECTIVE    Vitals:    06/11/25 0919   BP: 140/90   Pulse: 84   SpO2: 100%     Body mass index is 54.09 kg/m².     General: Alert, cooperative, no acute distress  Thyroid:  no enlargement/tenderness/palpable nodules  Lungs: Clear to auscultation bilaterally, respirations unlabored  Heart: Regular rate and rhythm, S1 and S2 normal, no murmur, rub or gallop  Abdomen: Soft, NT, ND and Bowel sounds Positive  Extremities:  Extremities normal, atraumatic,  "no cyanosis or edema    ==========================================================================    LAB EVALUATION    Lab Results   Component Value Date    GLUCOSE 96 02/12/2025    BUN 8 02/12/2025    CREATININE 0.98 02/12/2025    EGFRIFNONA 84 08/22/2019    BCR 8.2 02/12/2025    K 4.7 02/12/2025    CO2 26.8 02/12/2025    CALCIUM 9.3 02/12/2025    ALBUMIN 4.0 02/12/2025    AST 36 02/12/2025    ALT 54 (H) 02/12/2025    CHOL 179 02/12/2025    TRIG 110 02/12/2025    HDL 46 02/12/2025     (H) 02/12/2025     No results found for: \"HGBA1C\"  Lab Results   Component Value Date    CREATININE 0.98 02/12/2025     Lab Results   Component Value Date    TSH 1.420 02/12/2025       ==========================================================================    ASSESSMENT AND PLAN    # Morbid obesity  # Underlying history of atrial fibrillation with coronary artery disease and heart failure with reduced ejection fraction    - Patient will benefit from GLP-1 receptor agonist therapy  - Benefit and side effect of therapy discussed with patient in detail to which she was understanding  - There is no contraindication to use GLP-1 receptor agonist therapy  - Starting patient on Wegovy therapy:  Wegovy 0.25 mg once a week for 4 weeks and then increase to Wegovy 0.5 mg once a week  - Will connect patient to pharmacy services in the office    Thank you for courtesy of consultation.    Return to clinic: 2 months    Entire assessment and plan was discussed and counseled the patient in detail to which patient verbalized understanding and agreed with care.  Answered all queries and concerns.    Part of this note may be an electronic transcription/translation of spoken language to printed text using the Dragon Dictation System.     Note: Portions of this note may have been copied from previous notes but documentation have been reviewed and edited as necessary to support clinical decision making for today's " visit.    ==========================================================================    INFORMATION PROVIDED TO PATIENT    Patient Instructions   Please,    - If covered by the insurance please start taking Wegovy as follows:  0.25 mg once a week for 4 weeks  After 4 weeks increase to 0.5 mg once a week    -Plan for tentative follow-up in 2 months time    Thank you for your visit today.    If you have any questions or concerns please feel free to reach out of the office.       ==========================================================================  Jose Shane MD  Department of Endocrine, Diabetes and Metabolism  [place], [place]  ==========================================================================

## 2025-06-11 NOTE — PROGRESS NOTES
Specialty Pharmacy Patient Management Program  Initial Assessment     Marquise Short is a 44 y.o. male with obesity and enrolled in the Patient Management program offered by [facility]. An initial outreach was conducted, including assessment of therapy appropriateness and specialty medication education for Wegovy. The patient was introduced to services offered by [facility], including: regular assessments, refill coordination, curbside pick-up or mail order delivery options, prior authorization maintenance, and financial assistance programs as applicable. The patient was also provided with contact information for the pharmacy team.     Insurance Coverage & Financial Support  [facility]/JORGE Reyes PA submitted     Relevant Past Medical History and Comorbidities  Relevant medical history and concomitant health conditions were discussed with the patient. The patient's chart has been reviewed for relevant past medical history and comorbid health conditions and updated as necessary.   Past Medical History:   Diagnosis Date    A-fib     Cardiomyopathy     Hypertension      Social History     Socioeconomic History    Marital status: Single   Tobacco Use    Smoking status: Every Day     Types: Cigarettes     Passive exposure: Current    Smokeless tobacco: Current     Types: Chew   Vaping Use    Vaping status: Never Used   Substance and Sexual Activity    Alcohol use: Never    Drug use: Never    Sexual activity: Never     Problem list reviewed by Lilibeth Bello RPH on 6/11/2025 at  3:35 PM    Allergies  Known allergies and reactions were discussed with the patient. The patient's chart has been reviewed for allergy information and updated as necessary.   Patient has no known allergies.  Allergies reviewed by Lilibeth Bello RPH on 6/11/2025 at  3:35 PM    Current Medication List  This medication list has been reviewed with the patient and evaluated for any interactions or  necessary modifications/recommendations, and updated to include all prescription medications, OTC medications, and supplements the patient is currently taking. This list reflects what is contained in the patient's profile, which has also been marked as reviewed to communicate to other providers it is the most up to date version of the patient's current medication therapy.     Current Outpatient Medications:     bisoprolol-hydrochlorothiazide (ZIAC) 10-6.25 MG per tablet, TAKE 1 TABLET BY MOUTH DAILY, Disp: 90 tablet, Rfl: 2    empagliflozin (Jardiance) 10 MG tablet tablet, TAKE 1 TABLET BY MOUTH DAILY, Disp: 90 tablet, Rfl: 2    rivaroxaban (Xarelto) 20 MG tablet, TAKE 1 TABLET BY MOUTH DAILY, Disp: 90 tablet, Rfl: 2    sacubitril-valsartan (Entresto)  MG tablet, TAKE 1 TABLET BY MOUTH TWICE DAILY, Disp: 180 tablet, Rfl: 3    Semaglutide-Weight Management (Wegovy) 0.25 MG/0.5ML solution auto-injector, Inject 0.5 mL under the skin into the appropriate area as directed 1 (One) Time Per Week., Disp: 2 mL, Rfl: 0    Semaglutide-Weight Management (Wegovy) 0.5 MG/0.5ML solution auto-injector, Inject 0.5 mL under the skin into the appropriate area as directed 1 (One) Time Per Week., Disp: 2 mL, Rfl: 5    spironolactone (ALDACTONE) 50 MG tablet, TAKE 1 TABLET BY MOUTH DAILY, Disp: 90 tablet, Rfl: 3  Medicines reviewed by Lilibeth Bello RP on 6/11/2025 at  3:35 PM    Drug Interactions  none     Relevant Laboratory Values  Lab Results   Component Value Date    GLUCOSE 96 02/12/2025    CALCIUM 9.3 02/12/2025     02/12/2025    K 4.7 02/12/2025    CO2 26.8 02/12/2025     02/12/2025    BUN 8 02/12/2025    CREATININE 0.98 02/12/2025    BCR 8.2 02/12/2025    ANIONGAP 10.2 02/12/2025     Lab Results   Component Value Date    WBC 8.16 02/09/2024    HGB 17.9 (H) 02/09/2024    HCT 51.5 (H) 02/09/2024    .4 (H) 02/09/2024     02/09/2024    INR 1.52 (H) 02/18/2023     Lab Value Review  The above lab  values have been reviewed; the following specialty medication(s) dose adjustment(s) are recommended: none.    Initial Education Provided for Specialty Medication  The patient has been provided with the following education and any applicable administration techniques (i.e. self-injection) have been demonstrated for the therapies indicated. All questions and concerns have been addressed prior to the patient receiving the medication, and the patient has verbalized understanding of the education and any materials provided. Additional patient education shall be provided and documented upon request by the patient, provider or payer.      WEGOVY™ (semaglutide)  Medication Expectations   Why am I taking this medication? You are taking Wegovy to help you lose weight and to help with weight-related medical problems. Wegovy can also help to reduce your risk of heart attack or stroke if you are overweight and have cardiovascular disease.    What should I expect while on this medication? You can expect some weight loss, but this may vary between patients. It should be used along with a reduced calorie diet and increased physical activity.   How does the medication work? Wegovy is an injection that works with one of your body's natural responses to weight loss.  It works like a hormone, called GLP1, that helps regulate your appetite; this helps you eat less and can lead to weight loss.  This medication also slows down food from leaving your stomach, making you feel zuluaga for longer.   How long will I be on this medication for? The amount of time you will be on this medication will be determined by your doctor based on your weight loss and how well you tolerate the medication. Do not abruptly stop this medication without talking to your doctor first.    How do I take this medication? Take as directed on your prescription label. Wegovy is supplied in a single-use pen for each dose and you will use a new pre-filled pen each week.   It is injected under the skin (subcutaneously) of your stomach, thigh or upper arm. Use this medication once weekly, on the same day each week, with or without food.     What are some possible side effects? You may notice you don't feel as hungry, especially when you first start using Wegovy.  Some common side effects are nausea, stomach pain, constipation, and heartburn. Redness, itching, and/or swelling can occur where the shot was given.  You should also monitor for hypoglycemia if you are taking Wegovy with other medications that cause low blood sugar.     What happens if I miss a dose? If you miss a dose, take it as soon as you remember as long as your next scheduled dose is more than 2 days away.  If your next scheduled dose is within 2 days, take that regularly scheduled dose and skip your missed dose. If you miss more than 2 weeks of doses, call your healthcare provider to talk about how to restart your Wegovy.     Medication Safety   What are things I should warn my doctor immediately about? Do not use Wegovy if you or a family member have ever had medullary thyroid cancer (MTC) or Multiple Endocrine Neoplasia syndrome type 2 (MEN 2).  Tell your doctor if you have or have had problems with your kidneys, pancreas, or liver. Talk to your doctor if you are pregnant, planning to become pregnant, or breastfeeding or if you notice any signs/symptoms of an allergic reaction (rash, hives, difficulty breathing, etc.). Stop using Wegovy and call your doctor immediately if you have severe pain in your stomach area that will not go away.    What are things that I should be cautious of? Be cautious of any side effect from this medication. Talk to your doctor if any new ones develop or aren't getting better.   What are some medications that can interact with this one? Taking Wegovy with other medications that may also lower your blood sugar such as insulin and glipizide/glimepiride/glyburide may increase the risk of  hypoglycemia. Your doctor may reduce the dose of these medications when you start Wegovy. Always tell your doctor or pharmacist immediately if you start taking any new medications, including over-the-counter medications, vitamins, and herbal supplements.      Medication Storage/Handling   How should I handle this medication? Keep this medication out of reach of pets/children and keep the pen capped    How does this medication need to be stored? Store in the refrigerator prior to first use. After first use, the pen can be kept at room temperature for up to 28 days. Do not freeze; protect from light.    How should I dispose of this medication? Place your used needles in an approved sharps container or heavy-duty household container with a tight lid.If your doctor decides to stop this medication, take to your local police station for proper disposal. Some pharmacies also have take-back bins for medication drop-off.      Resources/Support   How can I remind myself to take this medication? You can download reminder apps to help you manage your refills or set an alarm on your phone to remind you.    Is financial support available?  [facility] can provide co-pay cards if you have commercial insurance or patient assistance if you have Medicare or no insurance.    Which vaccines are recommended for me? Talk to your doctor about these vaccines: Flu, Coronavirus (COVID-19), Pneumococcal (pneumonia), Tdap, Hepatitis B, Zoster (shingles)       Adherence, Self-Administration, and Current Therapy Problems  Adherence related to the patient's specialty therapy was discussed with the patient. The Adherence segment of this outreach has been reviewed and updated.          Additional Barriers to Patient Self-Administration: None noted.   Methods for Supporting Patient Self-Administration: N/A    Open Medication Therapy Problems  No medication therapy recommendations to display    Goals of Therapy   Goals Addressed Today         Specialty Pharmacy General Goal      To lose 1 - 2 lbs weekly until maintain a BMI of < 30 kg/m2              Reassessment Plan & Follow-Up  Medication Therapy Changes: New start Wegovy. Unfortunately, PA was denied. There is a  coupon for cash paying patients but patient was not interested at this time.   Additional Plans, Therapy Recommendations, or Therapy Problems to Be Addressed: Following up with Dr. Shane on how to proceed to assist patient.    Pharmacist to perform regular reassessments no more than (6) months from the previous assessment.  Welcome information and patient satisfaction survey to be sent by retail team with patient's initial fill.  Care Coordinator to set up future refill outreaches, coordinate prescription delivery, and escalate clinical questions to pharmacist.     Attestation  I attest the patient was actively involved in and has agreed to the above plan of care. I attest that the initiated specialty medication(s) are appropriate for the patient based on my assessment. If the prescribed therapy is at any point deemed not appropriate based on the current or future assessments, a consultation will be initiated with the patient's specialty care provider to determine the best course of action. The revised plan of therapy will be documented along with any reassessments and/or additional patient education provided.     Electronically signed by Lilibeth Bello RP, 06/11/25, 3:35 PM EDT.

## 2025-06-11 NOTE — PATIENT INSTRUCTIONS
Please,    - If covered by the insurance please start taking Wegovy as follows:  0.25 mg once a week for 4 weeks  After 4 weeks increase to 0.5 mg once a week    -Plan for tentative follow-up in 2 months time    Thank you for your visit today.    If you have any questions or concerns please feel free to reach out of the office.

## 2025-06-11 NOTE — PROGRESS NOTES
Specialty Pharmacy Patient Management Program  One-Time Clinical Outreach     Marquise Short is seen by an their provider for obesity and enrolled in the Endocrine Disorders Patient Management program offered by [facility]    At this time, the PA was denied for Wegovy. Patient must participate in a comprehensive weight management program for at least 6 month before the insurance will cover. Per patient's cardiologist, patient also has ERIBERTO. However, this diagnosis is not in patient chart and patient does not have a CPAP machine and states they have not participated in a sleep study so Zepbound for sleep apnea would likely not be approved.     Patient's enrollment has been marked UNDER REVIEW while it is determined how to best assist getting a medication covered for patient to help with weight loss. If it determined that patient is able to be placed on a targeted medication, we will update enrollment at that time.     Lilibeth Bello, PharmD, [facility]  Specialty Clinical Pharmacist  06/11/25  15:14 EDT        Specialty Pharmacy Patient Management Program  Prior Authorization Denial     Prior Authorization for Wegovy was Denied    Authorization / Reference / Case Number: See Media - 6/11/25 - I don't see a key or case number listed.     Benefits Investigation Summary  Prescription: New Therapy  Dispensing pharmacy: [facility]  Copay amount: Prescription was not approved.     PLAN: RX [employer]  BIN: [id number]  PCN: [id number]  RX [id number]: [id number]

## 2025-07-03 ENCOUNTER — TELEPHONE (OUTPATIENT)
Dept: BARIATRICS/WEIGHT MGMT | Facility: CLINIC | Age: 45
End: 2025-07-03
Payer: COMMERCIAL

## 2025-07-03 NOTE — TELEPHONE ENCOUNTER
Called and spoke with incoming referral, requested we mail [facility] new patient packet. Patient states depending on insurance, may not be interested in program.

## 2025-08-12 ENCOUNTER — OFFICE VISIT (OUTPATIENT)
Dept: CARDIOLOGY | Facility: CLINIC | Age: 45
End: 2025-08-12
Payer: COMMERCIAL

## 2025-08-12 ENCOUNTER — OFFICE VISIT (OUTPATIENT)
Dept: ENDOCRINOLOGY | Facility: CLINIC | Age: 45
End: 2025-08-12
Payer: COMMERCIAL

## 2025-08-12 VITALS
SYSTOLIC BLOOD PRESSURE: 128 MMHG | BODY MASS INDEX: 41.75 KG/M2 | OXYGEN SATURATION: 94 % | WEIGHT: 315 LBS | DIASTOLIC BLOOD PRESSURE: 83 MMHG | HEART RATE: 83 BPM | HEIGHT: 73 IN

## 2025-08-12 VITALS
BODY MASS INDEX: 41.75 KG/M2 | HEART RATE: 87 BPM | HEIGHT: 73 IN | DIASTOLIC BLOOD PRESSURE: 88 MMHG | SYSTOLIC BLOOD PRESSURE: 124 MMHG | WEIGHT: 315 LBS | OXYGEN SATURATION: 95 %

## 2025-08-12 DIAGNOSIS — I50.20 HFREF (HEART FAILURE WITH REDUCED EJECTION FRACTION): ICD-10-CM

## 2025-08-12 DIAGNOSIS — I48.91 ATRIAL FIBRILLATION, UNSPECIFIED TYPE: ICD-10-CM

## 2025-08-12 DIAGNOSIS — I48.0 PAROXYSMAL ATRIAL FIBRILLATION: ICD-10-CM

## 2025-08-12 DIAGNOSIS — I25.10 CORONARY ARTERY DISEASE INVOLVING NATIVE CORONARY ARTERY OF NATIVE HEART WITHOUT ANGINA PECTORIS: ICD-10-CM

## 2025-08-12 DIAGNOSIS — I42.8 NICM (NONISCHEMIC CARDIOMYOPATHY): Primary | ICD-10-CM

## 2025-08-12 DIAGNOSIS — Z79.01 LONG TERM CURRENT USE OF ANTICOAGULANT THERAPY: ICD-10-CM

## 2025-08-12 DIAGNOSIS — I10 ESSENTIAL HYPERTENSION: ICD-10-CM

## 2025-08-12 DIAGNOSIS — E66.01 MORBID OBESITY: Primary | ICD-10-CM

## 2025-08-12 DIAGNOSIS — E66.01 MORBID OBESITY: ICD-10-CM

## 2025-08-13 RX ORDER — RIVAROXABAN 20 MG/1
20 TABLET, FILM COATED ORAL DAILY
Qty: 90 TABLET | Refills: 3 | Status: SHIPPED | OUTPATIENT
Start: 2025-08-13

## 2025-08-20 ENCOUNTER — HOSPITAL ENCOUNTER (OUTPATIENT)
Dept: CARDIOLOGY | Facility: HOSPITAL | Age: 45
Discharge: HOME | End: 2025-08-20
Admitting: NURSE PRACTITIONER
Payer: COMMERCIAL

## 2025-08-20 VITALS
WEIGHT: 315 LBS | BODY MASS INDEX: 41.75 KG/M2 | SYSTOLIC BLOOD PRESSURE: 128 MMHG | DIASTOLIC BLOOD PRESSURE: 83 MMHG | HEIGHT: 73 IN | HEART RATE: 68 BPM

## 2025-08-20 DIAGNOSIS — I42.8 NICM (NONISCHEMIC CARDIOMYOPATHY): ICD-10-CM

## 2025-08-20 DIAGNOSIS — I48.0 PAROXYSMAL ATRIAL FIBRILLATION: ICD-10-CM

## 2025-08-20 LAB
AORTIC DIMENSIONLESS INDEX: 0.75 (DI)
AV MEAN PRESS GRAD SYS DOP V1V2: 2.8 MMHG
AV VMAX SYS DOP: 117.2 CM/SEC
BH CV ECHO MEAS - ACS: 2.6 CM
BH CV ECHO MEAS - AI P1/2T: 525.3 MSEC
BH CV ECHO MEAS - AO MAX PG: 5.5 MMHG
BH CV ECHO MEAS - AO ROOT DIAM: 4 CM
BH CV ECHO MEAS - AO V2 VTI: 19.8 CM
BH CV ECHO MEAS - AVA(I,D): 3.2 CM2
BH CV ECHO MEAS - EDV(CUBED): 137.8 ML
BH CV ECHO MEAS - EDV(MOD-SP4): 111.2 ML
BH CV ECHO MEAS - EF(MOD-SP4): 55.2 %
BH CV ECHO MEAS - ESV(CUBED): 59.8 ML
BH CV ECHO MEAS - ESV(MOD-SP4): 49.8 ML
BH CV ECHO MEAS - FS: 24.3 %
BH CV ECHO MEAS - IVS/LVPW: 0.97 CM
BH CV ECHO MEAS - IVSD: 1.19 CM
BH CV ECHO MEAS - LA DIMENSION: 4.8 CM
BH CV ECHO MEAS - LV DIASTOLIC VOL/BSA (35-75): 37.9 CM2
BH CV ECHO MEAS - LV MASS(C)D: 248.7 GRAMS
BH CV ECHO MEAS - LV MAX PG: 2.43 MMHG
BH CV ECHO MEAS - LV MEAN PG: 1.42 MMHG
BH CV ECHO MEAS - LV SYSTOLIC VOL/BSA (12-30): 17 CM2
BH CV ECHO MEAS - LV V1 MAX: 77.9 CM/SEC
BH CV ECHO MEAS - LV V1 VTI: 14.9 CM
BH CV ECHO MEAS - LVIDD: 5.2 CM
BH CV ECHO MEAS - LVIDS: 3.9 CM
BH CV ECHO MEAS - LVOT AREA: 4.2 CM2
BH CV ECHO MEAS - LVOT DIAM: 2.31 CM
BH CV ECHO MEAS - LVPWD: 1.22 CM
BH CV ECHO MEAS - MR MAX PG: 110.8 MMHG
BH CV ECHO MEAS - MR MAX VEL: 526.3 CM/SEC
BH CV ECHO MEAS - MV DEC TIME: 0.12 SEC
BH CV ECHO MEAS - MV E MAX VEL: 81.9 CM/SEC
BH CV ECHO MEAS - MV MAX PG: 4.4 MMHG
BH CV ECHO MEAS - MV MEAN PG: 1.18 MMHG
BH CV ECHO MEAS - MV V2 VTI: 21.6 CM
BH CV ECHO MEAS - MVA(VTI): 2.9 CM2
BH CV ECHO MEAS - PA ACC TIME: 0.02 SEC
BH CV ECHO MEAS - PA V2 MAX: 81.9 CM/SEC
BH CV ECHO MEAS - PI END-D VEL: 83.7 CM/SEC
BH CV ECHO MEAS - QP/QS: 1.41
BH CV ECHO MEAS - RAP SYSTOLE: 3 MMHG
BH CV ECHO MEAS - RV MAX PG: 2.02 MMHG
BH CV ECHO MEAS - RV V1 MAX: 71.1 CM/SEC
BH CV ECHO MEAS - RV V1 VTI: 13.4 CM
BH CV ECHO MEAS - RVDD: 2.7 CM
BH CV ECHO MEAS - RVOT DIAM: 2.9 CM
BH CV ECHO MEAS - RVSP: 24.2 MMHG
BH CV ECHO MEAS - SV(LVOT): 62.5 ML
BH CV ECHO MEAS - SV(MOD-SP4): 61.4 ML
BH CV ECHO MEAS - SV(RVOT): 88.3 ML
BH CV ECHO MEAS - SVI(LVOT): 21.3 ML/M2
BH CV ECHO MEAS - SVI(MOD-SP4): 20.9 ML/M2
BH CV ECHO MEAS - TAPSE (>1.6): 1.54 CM
BH CV ECHO MEAS - TR MAX PG: 21.2 MMHG
BH CV ECHO MEAS - TR MAX VEL: 230.4 CM/SEC
LV EF BIPLANE MOD: 55.2 %

## 2025-08-20 PROCEDURE — 93306 TTE W/DOPPLER COMPLETE: CPT

## 2025-08-20 PROCEDURE — 93306 TTE W/DOPPLER COMPLETE: CPT | Performed by: INTERNAL MEDICINE

## 2025-08-20 PROCEDURE — 25010000002 SULFUR HEXAFLUORIDE MICROSPH 60.7-25 MG RECONSTITUTED SUSPENSION: Performed by: INTERNAL MEDICINE

## 2025-08-20 RX ADMIN — SULFUR HEXAFLUORIDE 3 ML: KIT at 14:00

## 2025-08-26 ENCOUNTER — TELEPHONE (OUTPATIENT)
Dept: CARDIOLOGY | Facility: CLINIC | Age: 45
End: 2025-08-26
Payer: COMMERCIAL